# Patient Record
Sex: FEMALE | Race: BLACK OR AFRICAN AMERICAN | NOT HISPANIC OR LATINO | Employment: UNEMPLOYED | URBAN - METROPOLITAN AREA
[De-identification: names, ages, dates, MRNs, and addresses within clinical notes are randomized per-mention and may not be internally consistent; named-entity substitution may affect disease eponyms.]

---

## 2018-01-15 PROBLEM — R10.9 ABDOMINAL PAIN: Status: ACTIVE | Noted: 2018-01-15

## 2018-01-15 PROBLEM — K59.00 CONSTIPATION: Status: ACTIVE | Noted: 2018-01-15

## 2018-01-15 PROBLEM — K60.2 RECTAL FISSURE: Status: ACTIVE | Noted: 2018-01-15

## 2021-01-18 DIAGNOSIS — U07.1 COVID-19 VIRUS DETECTED: ICD-10-CM

## 2021-01-21 ENCOUNTER — NURSE TRIAGE (OUTPATIENT)
Dept: ADMINISTRATIVE | Facility: CLINIC | Age: 46
End: 2021-01-21

## 2021-05-06 ENCOUNTER — PATIENT MESSAGE (OUTPATIENT)
Dept: RESEARCH | Facility: HOSPITAL | Age: 46
End: 2021-05-06

## 2021-08-14 ENCOUNTER — OFFICE VISIT (OUTPATIENT)
Dept: URGENT CARE | Facility: CLINIC | Age: 46
End: 2021-08-14
Payer: MEDICAID

## 2021-08-14 VITALS
WEIGHT: 176 LBS | BODY MASS INDEX: 27.62 KG/M2 | HEART RATE: 60 BPM | OXYGEN SATURATION: 98 % | SYSTOLIC BLOOD PRESSURE: 133 MMHG | TEMPERATURE: 97 F | DIASTOLIC BLOOD PRESSURE: 88 MMHG | HEIGHT: 67 IN | RESPIRATION RATE: 16 BRPM

## 2021-08-14 DIAGNOSIS — R09.81 SINUS CONGESTION: Primary | ICD-10-CM

## 2021-08-14 DIAGNOSIS — J32.9 SINUSITIS, UNSPECIFIED CHRONICITY, UNSPECIFIED LOCATION: ICD-10-CM

## 2021-08-14 LAB
CTP QC/QA: YES
SARS-COV-2 RDRP RESP QL NAA+PROBE: NEGATIVE

## 2021-08-14 PROCEDURE — 99214 OFFICE O/P EST MOD 30 MIN: CPT | Mod: S$GLB,CS,, | Performed by: NURSE PRACTITIONER

## 2021-08-14 PROCEDURE — U0002: ICD-10-PCS | Mod: QW,S$GLB,, | Performed by: NURSE PRACTITIONER

## 2021-08-14 PROCEDURE — 99214 PR OFFICE/OUTPT VISIT, EST, LEVL IV, 30-39 MIN: ICD-10-PCS | Mod: S$GLB,CS,, | Performed by: NURSE PRACTITIONER

## 2021-08-14 PROCEDURE — U0002 COVID-19 LAB TEST NON-CDC: HCPCS | Mod: QW,S$GLB,, | Performed by: NURSE PRACTITIONER

## 2021-08-14 RX ORDER — ROSUVASTATIN CALCIUM 40 MG/1
TABLET, COATED ORAL
COMMUNITY
Start: 2021-05-23

## 2021-08-14 RX ORDER — DOCUSATE SODIUM 100 MG/1
CAPSULE, LIQUID FILLED ORAL
COMMUNITY

## 2021-08-14 RX ORDER — TOPIRAMATE 50 MG/1
TABLET, FILM COATED ORAL
COMMUNITY
Start: 2019-05-23

## 2021-08-14 RX ORDER — FLUOCINONIDE TOPICAL SOLUTION USP, 0.05% 0.5 MG/ML
SOLUTION TOPICAL
COMMUNITY

## 2021-08-14 RX ORDER — OXYBUTYNIN CHLORIDE 15 MG/1
TABLET, EXTENDED RELEASE ORAL
COMMUNITY
End: 2022-08-04

## 2021-08-14 RX ORDER — FLUTICASONE PROPIONATE 50 MCG
1 SPRAY, SUSPENSION (ML) NASAL DAILY
Qty: 1 ML | Refills: 0 | Status: SHIPPED | OUTPATIENT
Start: 2021-08-14 | End: 2022-08-14

## 2021-08-14 RX ORDER — FLUTICASONE PROPIONATE 50 MCG
1 SPRAY, SUSPENSION (ML) NASAL
COMMUNITY
Start: 2020-12-28 | End: 2021-08-14 | Stop reason: SDUPTHER

## 2021-08-14 RX ORDER — DULOXETIN HYDROCHLORIDE 60 MG/1
60 CAPSULE, DELAYED RELEASE ORAL DAILY
COMMUNITY
Start: 2021-02-21 | End: 2023-06-23

## 2021-08-14 RX ORDER — EZETIMIBE 10 MG/1
10 TABLET ORAL DAILY
COMMUNITY
Start: 2021-05-24 | End: 2022-08-04 | Stop reason: SDUPTHER

## 2021-08-14 RX ORDER — FLUCONAZOLE 150 MG/1
TABLET ORAL
COMMUNITY

## 2021-08-14 RX ORDER — DICYCLOMINE HYDROCHLORIDE 10 MG/1
CAPSULE ORAL
COMMUNITY
End: 2022-08-04 | Stop reason: SDUPTHER

## 2021-08-14 RX ORDER — BROMPHENIRAMINE MALEATE, PSEUDOEPHEDRINE HYDROCHLORIDE, AND DEXTROMETHORPHAN HYDROBROMIDE 2; 30; 10 MG/5ML; MG/5ML; MG/5ML
SYRUP ORAL
COMMUNITY
Start: 2021-05-17

## 2021-08-14 RX ORDER — EZETIMIBE 10 MG/1
TABLET ORAL
COMMUNITY

## 2022-09-20 ENCOUNTER — HOSPITAL ENCOUNTER (EMERGENCY)
Facility: HOSPITAL | Age: 47
Discharge: HOME | End: 2022-09-20
Attending: STUDENT IN AN ORGANIZED HEALTH CARE EDUCATION/TRAINING PROGRAM
Payer: MEDICAID

## 2022-09-20 VITALS
TEMPERATURE: 98 F | BODY MASS INDEX: 28.09 KG/M2 | WEIGHT: 179 LBS | SYSTOLIC BLOOD PRESSURE: 127 MMHG | HEART RATE: 60 BPM | HEIGHT: 67 IN | DIASTOLIC BLOOD PRESSURE: 78 MMHG | OXYGEN SATURATION: 99 % | RESPIRATION RATE: 16 BRPM

## 2022-09-20 DIAGNOSIS — R51.9 NONINTRACTABLE HEADACHE, UNSPECIFIED CHRONICITY PATTERN, UNSPECIFIED HEADACHE TYPE: ICD-10-CM

## 2022-09-20 DIAGNOSIS — R20.2 PARESTHESIA: Primary | ICD-10-CM

## 2022-09-20 PROCEDURE — 25000003 PHARM REV CODE 250: Performed by: NURSE PRACTITIONER

## 2022-09-20 PROCEDURE — 99283 EMERGENCY DEPT VISIT LOW MDM: CPT

## 2022-09-20 RX ORDER — DICLOFENAC SODIUM 75 MG/1
75 TABLET, DELAYED RELEASE ORAL 2 TIMES DAILY
Qty: 10 TABLET | Refills: 0 | Status: SHIPPED | OUTPATIENT
Start: 2022-09-20 | End: 2022-09-25

## 2022-09-20 RX ORDER — IBUPROFEN 400 MG/1
400 TABLET ORAL
Status: COMPLETED | OUTPATIENT
Start: 2022-09-20 | End: 2022-09-20

## 2022-09-20 RX ADMIN — IBUPROFEN 400 MG: 400 TABLET, FILM COATED ORAL at 08:09

## 2022-09-20 NOTE — Clinical Note
"Noah Ascencio" Lantigua was seen and treated in our emergency department on 9/20/2022.  She may return to work on 09/22/2022.       If you have any questions or concerns, please don't hesitate to call.      Meagan Mendoza NP"

## 2022-09-21 NOTE — ED PROVIDER NOTES
Encounter Date: 9/20/2022       History     Chief Complaint   Patient presents with    Numbness     Pt stated that she began experiencing numbess to left hand/foot earlier today along with headache. Denied recent illness/      This is a 47-year-old black female with a history of migraine headaches who presents to the emergency department with multiple complaints.  Patient states that today she began with gradual onset frontal headache that began approximately 2 hours ago.  She admits to experiencing migraines before in the past, however she has concerns regarding her blood pressure given her family history.  She states headache is similar to headaches experience in the past.  She also reports experiencing intermittent numbness and tingling to the left hand and left foot.  She reports symptoms wax and wane and also reports experiencing intermittent neck and shoulder pain and left lower back pain.  Review of EMR reveals evaluation for similar symptoms in the past.  She denies vision changes, confusion, difficulty with speech, muscle weakness, nausea/vomiting, chest pain, shortness of breath, or vomiting.    Review of patient's allergies indicates:  No Known Allergies  Past Medical History:   Diagnosis Date    Allergic rhinitis     Anxiety     Chronic constipation     sees Dr. Martinez, been scoped x1    Depression     GERD (gastroesophageal reflux disease)     History of colonoscopy     Migraine headache      Past Surgical History:   Procedure Laterality Date    COLONOSCOPY      HYSTERECTOMY      UPPER GASTROINTESTINAL ENDOSCOPY       Family History   Problem Relation Age of Onset    No Known Problems Mother     Diabetes Father     Cancer Father      Social History     Tobacco Use    Smoking status: Never    Smokeless tobacco: Never   Substance Use Topics    Alcohol use: Yes     Comment: Social    Drug use: No     Review of Systems   Constitutional: Negative.    HENT: Negative.     Respiratory: Negative.      Cardiovascular: Negative.    Gastrointestinal:  Negative for abdominal pain, nausea and vomiting.   Musculoskeletal:  Positive for back pain and neck pain.   Neurological:  Positive for numbness and headaches.     Physical Exam     Initial Vitals [09/20/22 1948]   BP Pulse Resp Temp SpO2   127/78 60 16 98.3 °F (36.8 °C) 99 %      MAP       --         Physical Exam    Nursing note and vitals reviewed.  Constitutional: She appears well-developed and well-nourished. She is active. No distress.   HENT:   Head: Normocephalic and atraumatic.   Mouth/Throat: Oropharynx is clear and moist. No oropharyngeal exudate.   Eyes: EOM are normal. Pupils are equal, round, and reactive to light.   Neck: Neck supple.   Normal range of motion.  Cardiovascular:  Normal rate, regular rhythm and normal heart sounds.           Pulmonary/Chest: Breath sounds normal. No respiratory distress.   Musculoskeletal:         General: Normal range of motion.      Cervical back: Normal range of motion and neck supple.      Comments: Hands and feet appear normal upon inspection, no rash or discoloration noted.  Sensation intact, range of motion normal with 5/5 muscle strength.  Neurovascularly intact     Neurological: She is alert and oriented to person, place, and time. She has normal strength. No cranial nerve deficit. GCS score is 15. GCS eye subscore is 4. GCS verbal subscore is 5. GCS motor subscore is 6.   Cranial nerves 2-12 intact, patient able to perform rapid alternating movement tests appropriately.   Skin: Skin is warm and dry. Capillary refill takes less than 2 seconds.   Psychiatric: She has a normal mood and affect. Her behavior is normal. Thought content normal.       ED Course   Procedures  Labs Reviewed - No data to display       Imaging Results    None          Medications   ibuprofen tablet 400 mg (has no administration in time range)                              Clinical Impression:   Final diagnoses:  [R20.2] Paresthesia  (Primary)  [R51.9] Nonintractable headache, unspecified chronicity pattern, unspecified headache type      ED Disposition Condition    Discharge Stable          ED Prescriptions       Medication Sig Dispense Start Date End Date Auth. Provider    diclofenac (VOLTAREN) 75 MG EC tablet Take 1 tablet (75 mg total) by mouth 2 (two) times daily. for 5 days 10 tablet 9/20/2022 9/25/2022 Meagan Mendoza NP          Follow-up Information       Follow up With Specialties Details Why Contact Info    PCP Follow UP  Schedule an appointment as soon as possible for a visit in 2 days for follow-up, for re-evaluation of today's complaint              Meagan Mendoza NP  09/20/22 2020

## 2022-09-21 NOTE — DISCHARGE INSTRUCTIONS
Take medication as directed.  It is important that you follow-up with your primary doctor in 1-2 days for further evaluation.  Return to the emergency room for worsening condition.

## 2022-11-14 ENCOUNTER — HOSPITAL ENCOUNTER (EMERGENCY)
Facility: HOSPITAL | Age: 47
Discharge: HOME | End: 2022-11-14
Attending: EMERGENCY MEDICINE
Payer: MEDICAID

## 2022-11-14 VITALS
HEIGHT: 67 IN | HEART RATE: 70 BPM | OXYGEN SATURATION: 99 % | BODY MASS INDEX: 28.41 KG/M2 | TEMPERATURE: 99 F | WEIGHT: 181 LBS | SYSTOLIC BLOOD PRESSURE: 123 MMHG | RESPIRATION RATE: 16 BRPM | DIASTOLIC BLOOD PRESSURE: 69 MMHG

## 2022-11-14 DIAGNOSIS — M79.604 RIGHT LEG PAIN: Primary | ICD-10-CM

## 2022-11-14 LAB — D DIMER PPP IA.FEU-MCNC: 0.36 MG/L FEU

## 2022-11-14 PROCEDURE — 99284 EMERGENCY DEPT VISIT MOD MDM: CPT

## 2022-11-14 PROCEDURE — 63600175 PHARM REV CODE 636 W HCPCS: Performed by: EMERGENCY MEDICINE

## 2022-11-14 PROCEDURE — 36415 COLL VENOUS BLD VENIPUNCTURE: CPT | Performed by: EMERGENCY MEDICINE

## 2022-11-14 PROCEDURE — 85379 FIBRIN DEGRADATION QUANT: CPT | Performed by: EMERGENCY MEDICINE

## 2022-11-14 PROCEDURE — 96372 THER/PROPH/DIAG INJ SC/IM: CPT | Performed by: EMERGENCY MEDICINE

## 2022-11-14 RX ORDER — CYCLOBENZAPRINE HCL 10 MG
10 TABLET ORAL 3 TIMES DAILY PRN
Qty: 15 TABLET | Refills: 0 | Status: SHIPPED | OUTPATIENT
Start: 2022-11-14 | End: 2022-11-19

## 2022-11-14 RX ORDER — DICLOFENAC SODIUM 75 MG/1
75 TABLET, DELAYED RELEASE ORAL 2 TIMES DAILY PRN
Qty: 10 TABLET | Refills: 0 | Status: SHIPPED | OUTPATIENT
Start: 2022-11-14

## 2022-11-14 RX ORDER — KETOROLAC TROMETHAMINE 30 MG/ML
30 INJECTION, SOLUTION INTRAMUSCULAR; INTRAVENOUS
Status: COMPLETED | OUTPATIENT
Start: 2022-11-14 | End: 2022-11-14

## 2022-11-14 RX ADMIN — KETOROLAC TROMETHAMINE 30 MG: 30 INJECTION, SOLUTION INTRAMUSCULAR at 07:11

## 2022-11-15 NOTE — ED PROVIDER NOTES
"Encounter Date: 11/14/2022       History     Chief Complaint   Patient presents with    Leg Pain     Pt stated that she began experiencing pain to right thigh and right calf earlier in the evening. Stated that she has a "vein surgery" about a month ago.      48 yo  female with history of PAD here via POV with complaint of right leg pain x 1 day. No trauma. Gradual onset. No numbness or tingling. No swelling. No prior similar. No history of DVT.     Review of patient's allergies indicates:  No Known Allergies  Past Medical History:   Diagnosis Date    Allergic rhinitis     Anxiety     Chronic constipation     sees Dr. Martinez, been scoped x1    Depression     GERD (gastroesophageal reflux disease)     History of colonoscopy     Migraine headache      Past Surgical History:   Procedure Laterality Date    COLONOSCOPY      HYSTERECTOMY      UPPER GASTROINTESTINAL ENDOSCOPY       Family History   Problem Relation Age of Onset    No Known Problems Mother     Diabetes Father     Cancer Father      Social History     Tobacco Use    Smoking status: Never    Smokeless tobacco: Never   Substance Use Topics    Alcohol use: Yes     Comment: Social    Drug use: No     Review of Systems   Constitutional: Negative.    Respiratory: Negative.     Cardiovascular: Negative.    Gastrointestinal: Negative.    All other systems reviewed and are negative.    Physical Exam     Initial Vitals [11/14/22 1909]   BP Pulse Resp Temp SpO2   123/69 70 16 98.6 °F (37 °C) 99 %      MAP       --         Physical Exam    Nursing note and vitals reviewed.  Constitutional: She appears well-developed and well-nourished. She is not diaphoretic. No distress.   HENT:   Head: Normocephalic and atraumatic.   Eyes: EOM are normal. Pupils are equal, round, and reactive to light.   Neck: Neck supple.   Normal range of motion.  Cardiovascular:  Normal rate, regular rhythm and intact distal pulses.     Exam reveals no gallop.       Pulmonary/Chest: Breath sounds " normal. No respiratory distress. She has no wheezes. She has no rales.   Abdominal: Abdomen is soft. Bowel sounds are normal. She exhibits no distension. There is no abdominal tenderness. There is no rebound.   Musculoskeletal:         General: No tenderness or edema. Normal range of motion.      Cervical back: Normal range of motion and neck supple.     Neurological: She is alert. She has normal strength and normal reflexes.   Skin: Skin is warm and dry. Capillary refill takes less than 2 seconds.       ED Course   Procedures  Labs Reviewed   D DIMER, QUANTITATIVE          Imaging Results    None          Medications   ketorolac injection 30 mg (30 mg Intramuscular Given 11/14/22 1945)     Medical Decision Making:   Clinical Tests:   Lab Tests: Ordered and Reviewed                        Clinical Impression:   Final diagnoses:  [M79.604] Right leg pain (Primary)        ED Disposition Condition    Discharge Stable          ED Prescriptions       Medication Sig Dispense Start Date End Date Auth. Provider    diclofenac (VOLTAREN) 75 MG EC tablet Take 1 tablet (75 mg total) by mouth 2 (two) times daily as needed. 10 tablet 11/14/2022 -- Ambrosio Murphy MD    cyclobenzaprine (FLEXERIL) 10 MG tablet Take 1 tablet (10 mg total) by mouth 3 (three) times daily as needed for Muscle spasms. 15 tablet 11/14/2022 11/19/2022 Ambrosio Murphy MD          Follow-up Information    None          Ambrosio Murphy MD  11/14/22 2024

## 2022-12-15 ENCOUNTER — HOSPITAL ENCOUNTER (OUTPATIENT)
Dept: RADIOLOGY | Facility: HOSPITAL | Age: 47
Discharge: HOME | End: 2022-12-15
Payer: MEDICAID

## 2022-12-15 DIAGNOSIS — M79.604 RIGHT LEG PAIN: ICD-10-CM

## 2022-12-15 DIAGNOSIS — M79.604 RIGHT LEG PAIN: Primary | ICD-10-CM

## 2022-12-15 PROCEDURE — 93971 EXTREMITY STUDY: CPT | Mod: TC,RT

## 2022-12-21 DIAGNOSIS — Z12.31 ENCOUNTER FOR SCREENING MAMMOGRAM FOR MALIGNANT NEOPLASM OF BREAST: Primary | ICD-10-CM

## 2022-12-23 ENCOUNTER — HOSPITAL ENCOUNTER (OUTPATIENT)
Dept: RADIOLOGY | Facility: HOSPITAL | Age: 47
Discharge: HOME | End: 2022-12-23
Attending: OBSTETRICS & GYNECOLOGY
Payer: MEDICAID

## 2022-12-23 VITALS — WEIGHT: 181 LBS | HEIGHT: 67 IN | BODY MASS INDEX: 28.41 KG/M2

## 2022-12-23 DIAGNOSIS — Z12.31 ENCOUNTER FOR SCREENING MAMMOGRAM FOR MALIGNANT NEOPLASM OF BREAST: ICD-10-CM

## 2022-12-23 PROCEDURE — 77063 BREAST TOMOSYNTHESIS BI: CPT | Mod: TC

## 2022-12-23 PROCEDURE — 77067 SCR MAMMO BI INCL CAD: CPT | Mod: TC

## 2023-01-04 ENCOUNTER — TELEPHONE (OUTPATIENT)
Dept: ORTHOPEDICS | Facility: CLINIC | Age: 48
End: 2023-01-04
Payer: MEDICAID

## 2023-01-05 ENCOUNTER — HOSPITAL ENCOUNTER (OUTPATIENT)
Dept: RADIOLOGY | Facility: HOSPITAL | Age: 48
Discharge: HOME | End: 2023-01-05
Attending: NURSE PRACTITIONER
Payer: MEDICAID

## 2023-01-05 ENCOUNTER — OFFICE VISIT (OUTPATIENT)
Dept: ORTHOPEDICS | Facility: CLINIC | Age: 48
End: 2023-01-05
Payer: MEDICAID

## 2023-01-05 VITALS
WEIGHT: 181 LBS | SYSTOLIC BLOOD PRESSURE: 110 MMHG | OXYGEN SATURATION: 97 % | HEIGHT: 67 IN | DIASTOLIC BLOOD PRESSURE: 74 MMHG | HEART RATE: 66 BPM | BODY MASS INDEX: 28.41 KG/M2

## 2023-01-05 DIAGNOSIS — M25.561 ACUTE PAIN OF RIGHT KNEE: ICD-10-CM

## 2023-01-05 DIAGNOSIS — M79.661 PAIN IN BOTH LOWER LEGS: ICD-10-CM

## 2023-01-05 DIAGNOSIS — M25.561 ACUTE PAIN OF RIGHT KNEE: Primary | ICD-10-CM

## 2023-01-05 DIAGNOSIS — M54.10 RADICULAR SYNDROME OF LOWER LIMBS: ICD-10-CM

## 2023-01-05 DIAGNOSIS — M79.662 PAIN IN BOTH LOWER LEGS: ICD-10-CM

## 2023-01-05 PROCEDURE — 99203 OFFICE O/P NEW LOW 30 MIN: CPT | Mod: S$PBB,,, | Performed by: NURSE PRACTITIONER

## 2023-01-05 PROCEDURE — 1160F PR REVIEW ALL MEDS BY PRESCRIBER/CLIN PHARMACIST DOCUMENTED: ICD-10-PCS | Mod: CPTII,,, | Performed by: NURSE PRACTITIONER

## 2023-01-05 PROCEDURE — 3078F DIAST BP <80 MM HG: CPT | Mod: CPTII,,, | Performed by: NURSE PRACTITIONER

## 2023-01-05 PROCEDURE — 3008F PR BODY MASS INDEX (BMI) DOCUMENTED: ICD-10-PCS | Mod: CPTII,,, | Performed by: NURSE PRACTITIONER

## 2023-01-05 PROCEDURE — 1160F RVW MEDS BY RX/DR IN RCRD: CPT | Mod: CPTII,,, | Performed by: NURSE PRACTITIONER

## 2023-01-05 PROCEDURE — 99999 PR PBB SHADOW E&M-EST. PATIENT-LVL III: ICD-10-PCS | Mod: PBBFAC,,, | Performed by: NURSE PRACTITIONER

## 2023-01-05 PROCEDURE — 1159F PR MEDICATION LIST DOCUMENTED IN MEDICAL RECORD: ICD-10-PCS | Mod: CPTII,,, | Performed by: NURSE PRACTITIONER

## 2023-01-05 PROCEDURE — 99999 PR PBB SHADOW E&M-EST. PATIENT-LVL III: CPT | Mod: PBBFAC,,, | Performed by: NURSE PRACTITIONER

## 2023-01-05 PROCEDURE — 3074F SYST BP LT 130 MM HG: CPT | Mod: CPTII,,, | Performed by: NURSE PRACTITIONER

## 2023-01-05 PROCEDURE — 99213 OFFICE O/P EST LOW 20 MIN: CPT | Mod: PBBFAC | Performed by: NURSE PRACTITIONER

## 2023-01-05 PROCEDURE — 73562 X-RAY EXAM OF KNEE 3: CPT | Mod: TC,RT

## 2023-01-05 PROCEDURE — 3074F PR MOST RECENT SYSTOLIC BLOOD PRESSURE < 130 MM HG: ICD-10-PCS | Mod: CPTII,,, | Performed by: NURSE PRACTITIONER

## 2023-01-05 PROCEDURE — 1159F MED LIST DOCD IN RCRD: CPT | Mod: CPTII,,, | Performed by: NURSE PRACTITIONER

## 2023-01-05 PROCEDURE — 3078F PR MOST RECENT DIASTOLIC BLOOD PRESSURE < 80 MM HG: ICD-10-PCS | Mod: CPTII,,, | Performed by: NURSE PRACTITIONER

## 2023-01-05 PROCEDURE — 99203 PR OFFICE/OUTPT VISIT, NEW, LEVL III, 30-44 MIN: ICD-10-PCS | Mod: S$PBB,,, | Performed by: NURSE PRACTITIONER

## 2023-01-05 PROCEDURE — 3008F BODY MASS INDEX DOCD: CPT | Mod: CPTII,,, | Performed by: NURSE PRACTITIONER

## 2023-01-05 RX ORDER — HYDROXYZINE HYDROCHLORIDE 25 MG/1
25 TABLET, FILM COATED ORAL 3 TIMES DAILY
COMMUNITY

## 2023-01-05 NOTE — PROGRESS NOTES
Subjective:       Noah Lantigua is a 47 y.o. female who presents for evaluation of right knee and bilateral lower leg pain. She is referred by [facility]. She states onset of several months of discomfort in the right knee, worsen over the past 3 weeks after having a twist type injury to her knee. She states that she is seeing [facility] for PAD in her lower extremities and had recent vein procedures done. She reports increasing pain in her legs, she was told that the pain may be referred from her back. She went to the ER on 11/14/22 due to right leg pain. Ultrasound was done and was negative for DVT. She presents and rates her pain as 5/10 in the right knee area but also has discomfort in the left lower leg as well. She reports mild back stiffness. She denies any groin or anterior thigh discomfort. She reports intermittent radicular pain and tingling/numbness into the lower legs. The pain is described as aching and throbbing. Symptoms are exacerbated by standing, walking and prolonged sitting. She is taking diclofenac with mild relief. She is noted with a mild limp.     The following portions of the patient's history were reviewed and updated as appropriate: allergies, current medications, past family history, past medical history, past social history, past surgical history and problem list.     Review of Systems  Constitutional: negative  Musculoskeletal:positive for back pain and stiff joints  Neurological: negative  Behavioral/Psych: negative      Objective:   Neurologically intact.   Mild antalgic gait.      Lumbar exam:  Normal range of motion.   Muscle tone: negative muscle spasm  Straight leg raise: neg  Neurological: DTRs: +2 L4 B/L and +2S1 B/L, sensation intact lower dermatomes.   Muscle strengths: Decreased quad strength B/L 4/5.      HIP Exam:  AROM/PROM hips normal, no groin pain.   No tenderness trochanters and IT bands.   Neg jar test. Neg fadirs. No pain with log-rolling.   Brisk cap  refill noted.     RT Knee Exam:  No effusion. Normal AROM.   No instability. Neg Mcmurrays. No crepitus.   Mild TTP joint lines.      RADIOGRAPHS: Ordered and reviewed RT knee 3 V  Medial joint space narrowing.    No significant effusion.    No acute fracture.    Assessment:   1. RT knee pain, mild narrowing noted.   2. Pain lower legs bilateral, unspecified.  3. Lumbar radiculopathy suspected.     Plan:      1. Radiographs show some narrowing of the medial knee joint. Exam is benign. Reviewed physician directed exercises for ROM/strengthening. (3x wk x 6 wks), packet given.   2. Referral to [facility] (Dr Rodriguez) for unspecified radiculitis lower extremities. Suspected lumbar radiculopathy.  3. Continue diclofenac as previous. Ice and rest. Limit stairs and squatting.   4. RTC as directed post [facility] visit for follow up, consider MRI L-spine and RT knee if needed.  5. She had no further questions.

## 2023-03-16 ENCOUNTER — HOSPITAL ENCOUNTER (EMERGENCY)
Facility: HOSPITAL | Age: 48
Discharge: HOME | End: 2023-03-16
Attending: EMERGENCY MEDICINE
Payer: MEDICAID

## 2023-03-16 VITALS
WEIGHT: 183 LBS | TEMPERATURE: 98 F | DIASTOLIC BLOOD PRESSURE: 71 MMHG | OXYGEN SATURATION: 99 % | RESPIRATION RATE: 16 BRPM | SYSTOLIC BLOOD PRESSURE: 121 MMHG | HEART RATE: 72 BPM | BODY MASS INDEX: 28.72 KG/M2 | HEIGHT: 67 IN

## 2023-03-16 DIAGNOSIS — M79.605 PAIN OF LEFT LOWER EXTREMITY: Primary | ICD-10-CM

## 2023-03-16 PROCEDURE — 99283 EMERGENCY DEPT VISIT LOW MDM: CPT

## 2023-03-16 RX ORDER — NAPROXEN 500 MG/1
500 TABLET ORAL EVERY 12 HOURS PRN
Qty: 20 TABLET | Refills: 0 | Status: SHIPPED | OUTPATIENT
Start: 2023-03-16

## 2023-03-16 NOTE — ED PROVIDER NOTES
"EMERGENCY DEPARTMENT HISTORY AND PHYSICAL EXAM     This note is dictated on M*[employer] word recognition program.  There are word recognition mistakes and grammatical errors that are occasionally missed on review.     Date: 3/16/2023   Patient Name: Noah Lantigua       History of Presenting Illness      Chief Complaint   Patient presents with    Leg Problem     Pt stated that she noticed a "knot on her left lower leg". Denied any known injury/trauma.         1730   Noah Lantigua is a 47 y.o. female with PMHX of anxiety, depression who presents to the emergency department C/O left leg pain.    Patient reports feeling a knot in her left lower leg.  Noticed today.  Reports it is tender.  No trauma or injury.  She has not tried anything for this problem yet.      PCP: [facility]        No current facility-administered medications for this encounter.     Current Outpatient Medications   Medication Sig Dispense Refill    AMITIZA 24 mcg Cap TAKE 1 CAPSULE(24 MCG) BY MOUTH TWICE DAILY (Patient not taking: Reported on 1/5/2023) 90 capsule 3    aspirin (ECOTRIN) 81 MG EC tablet Take 81 mg by mouth once daily.      atorvastatin (LIPITOR) 20 MG tablet Take 20 mg by mouth once daily.      BRINTELLIX 10 mg Tab Take 1 tablet by mouth once daily.   2    brompheniramine-pseudoeph-DM (BROMFED DM) 2-30-10 mg/5 mL Syrp TAKE 10 ML BY MOUTH EVERY 6 HOURS AS NEEDED FOR COUGH AND CONGESTION      clonazePAM (KLONOPIN) 0.5 MG tablet TK 1 T PO ONCE D PRA      cyproheptadine (PERIACTIN) 4 mg tablet Take 4 mg by mouth 3 (three) times daily as needed.      diclofenac (VOLTAREN) 75 MG EC tablet Take 1 tablet (75 mg total) by mouth 2 (two) times daily as needed. 10 tablet 0    dicyclomine (BENTYL) 10 MG capsule TAKE ONE CAPSULE BY MOUTH FOUR TIMES DAILY BEFORE MEALS AND NIGHTLY 120 capsule 0    docusate sodium (COLACE) 100 MG capsule Doc-Q-Lace 100 mg capsule   TK 1 C PO BID      DULoxetine (CYMBALTA) 60 MG capsule " Take 60 mg by mouth once daily.      estradiol (ESTRACE) 2 MG tablet Take 2 mg by mouth once daily.      ezetimibe (ZETIA) 10 mg tablet ezetimibe 10 mg tablet   TK 1 T PO QD      fluconazole (DIFLUCAN) 150 MG Tab fluconazole 150 mg tablet      fluocinonide (LIDEX) 0.05 % external solution fluocinonide 0.05 % topical solution      fluoxetine (PROZAC) 10 MG capsule Take 10 mg by mouth once daily.      hydrOXYzine HCL (ATARAX) 25 MG tablet Take 25 mg by mouth 3 (three) times daily.      lorazepam (ATIVAN) 0.5 MG tablet Take 0.5 mg by mouth every 6 (six) hours as needed for Anxiety.      montelukast (SINGULAIR) 10 mg tablet Take 10 mg by mouth every evening.      naproxen (NAPROSYN) 500 MG tablet Take 1 tablet (500 mg total) by mouth every 12 (twelve) hours as needed (Pain). 20 tablet 0    pantoprazole (PROTONIX) 40 MG tablet Take 1 tablet (40 mg total) by mouth once daily. 30 tablet 1    plecanatide (TRULANCE) 3 mg Tab Take 3 mg by mouth once daily. 90 tablet 3    polyethylene glycol (GLYCOLAX) 17 gram/dose powder Take 17 g by mouth once daily. 507 g 0    predniSONE (DELTASONE) 10 MG tablet Take 10 mg by mouth once daily.      QUEtiapine (SEROQUEL) 50 MG tablet Take 50 mg by mouth every evening.      rosuvastatin (CRESTOR) 40 MG Tab TAKE 1 TABLET BY MOUTH 1 TIME IN THE EVENING      sorbitoL 70 % solution Take 15 mLs by mouth every 72 hours as needed (Breakthrough constipation not responsive to other medications). 473 mL 0    topiramate (TOPAMAX) 50 MG tablet topiramate 50 mg tablet   Take 1 tablet twice a day by oral route as directed for 30 days.             Past History     Past Medical History:   Past Medical History:   Diagnosis Date    Allergic rhinitis     Anxiety     Chronic constipation     sees Dr. Martinez, been scoped x1    Depression     GERD (gastroesophageal reflux disease)     History of colonoscopy     Migraine headache         Past Surgical History:   Past Surgical History:   Procedure Laterality Date     "COLONOSCOPY      HYSTERECTOMY      OOPHORECTOMY      UPPER GASTROINTESTINAL ENDOSCOPY          Family History:   Family History   Problem Relation Age of Onset    No Known Problems Mother     Diabetes Father     Cancer Father     No Known Problems Sister     No Known Problems Daughter     No Known Problems Maternal Aunt     No Known Problems Maternal Uncle     No Known Problems Paternal Aunt     No Known Problems Paternal Uncle     No Known Problems Maternal Grandmother     No Known Problems Maternal Grandfather     No Known Problems Paternal Grandmother     No Known Problems Paternal Grandfather     No Known Problems Other     Breast cancer Neg Hx     Ovarian cancer Neg Hx     BRCA 1/2 Neg Hx         Social History:   Social History     Tobacco Use    Smoking status: Never    Smokeless tobacco: Never   Substance Use Topics    Alcohol use: Yes     Comment: Social    Drug use: No        Allergies:   Review of patient's allergies indicates:  No Known Allergies       Review of Systems   Review of Systems   See HPI for pertinent positives and negatives       Physical Exam     Vitals:    03/16/23 1636   BP: 121/71   Pulse: 72   Resp: 16   Temp: 98.4 °F (36.9 °C)   SpO2: 99%   Weight: 83 kg (183 lb)   Height: 5' 7" (1.702 m)      Physical Exam  Vitals and nursing note reviewed.   Constitutional:       General: She is not in acute distress.     Appearance: Normal appearance. She is not ill-appearing.   HENT:      Head: Normocephalic and atraumatic.      Right Ear: External ear normal.      Left Ear: External ear normal.      Nose: Nose normal. No congestion or rhinorrhea.      Mouth/Throat:      Mouth: Mucous membranes are moist.   Eyes:      Conjunctiva/sclera: Conjunctivae normal.      Pupils: Pupils are equal, round, and reactive to light.   Pulmonary:      Effort: Pulmonary effort is normal. No respiratory distress.   Musculoskeletal:         General: No deformity. Normal range of motion.      Cervical back: Normal " range of motion. No rigidity.      Right lower leg: Normal. No swelling. No edema.      Left lower leg: Tenderness present. No swelling or bony tenderness. No edema.        Legs:       Comments: Minor fullness and tenderness to the medial aspect just adjacent to mid tibia, skin is normal   Skin:     General: Skin is dry.   Neurological:      General: No focal deficit present.      Mental Status: She is alert and oriented to person, place, and time. Mental status is at baseline.   Psychiatric:         Mood and Affect: Mood normal.         Behavior: Behavior normal.            Diagnostic Study Results      Labs -   No results found for this or any previous visit (from the past 12 hour(s)).     Radiologic Studies -    No orders to display        Medications given in the ED-   Medications - No data to display        Medical Decision Making    I am the first provider for this patient.     I reviewed the vital signs, available nursing notes, past medical history, past surgical history, family history and social history.     Vital Signs:  Reviewed the patient's vital signs.     Pulse Oximetry Analysis and Interpretation:    99% on Room Air, normal      External Test Results (Pertinent to encounter):    Records Reviewed: Nursing Notes and Current Prescription Medications    History Obtained By: Patient    Provider Notes: Noah Lantigua is a 47 y.o. female with left leg pain    Co-morbidities Considered:  No significant comorbidities such as history of DVT    Differential Diagnosis:  Myalgias, muscle spasm, bony lesion, superficial thrombophlebitis      ED Course:    Patient here with atraumatic left lower extremity pain.  Benign exam.  No tenderness or fullness along deep venous system.  No bony tenderness.  Doubt malignancy or DVT.  Advised symptomatic management including NSAIDs, warm compresses.  Advised patient return to ED if she develops leg swelling or tenderness along the posterior aspect of her lower leg or  knee         Problems Addressed:  Leg pain    Procedures:   Procedures       Diagnosis and Disposition     Critical Care:      DISCHARGE NOTE:       Noah Lantigua's  results have been reviewed with her.  She has been counseled regarding her diagnosis, treatment, and plan.  She verbally conveys understanding and agreement of the signs, symptoms, diagnosis, treatment and prognosis and additionally agrees to follow up as discussed.  She also agrees with the care-plan and conveys that all of her questions have been answered.  I have also provided discharge instructions for her that include: educational information regarding their diagnosis and treatment, and list of reasons why they would want to return to the ED prior to their follow-up appointment, should her condition change. She has been provided with education for proper emergency department utilization.         CLINICAL IMPRESSION:         1. Pain of left lower extremity              PLAN:   1. Discharge Home  2.      Medication List        START taking these medications      naproxen 500 MG tablet  Commonly known as: NAPROSYN  Take 1 tablet (500 mg total) by mouth every 12 (twelve) hours as needed (Pain).            ASK your doctor about these medications      AMITIZA 24 MCG Cap  Generic drug: lubiprostone  TAKE 1 CAPSULE(24 MCG) BY MOUTH TWICE DAILY     aspirin 81 MG EC tablet  Commonly known as: ECOTRIN     atorvastatin 20 MG tablet  Commonly known as: LIPITOR     BRINTELLIX 10 mg Tab  Generic drug: vortioxetine     brompheniramine-pseudoeph-DM 2-30-10 mg/5 mL Syrp  Commonly known as: BROMFED DM     clonazePAM 0.5 MG tablet  Commonly known as: KlonoPIN     cyproheptadine 4 mg tablet  Commonly known as: PERIACTIN     diclofenac 75 MG EC tablet  Commonly known as: VOLTAREN  Take 1 tablet (75 mg total) by mouth 2 (two) times daily as needed.     dicyclomine 10 MG capsule  Commonly known as: BENTYL  TAKE ONE CAPSULE BY MOUTH FOUR TIMES DAILY BEFORE MEALS AND  NIGHTLY     docusate sodium 100 MG capsule  Commonly known as: COLACE     DULoxetine 60 MG capsule  Commonly known as: CYMBALTA     estradioL 2 MG tablet  Commonly known as: ESTRACE     ezetimibe 10 mg tablet  Commonly known as: ZETIA     fluconazole 150 MG Tab  Commonly known as: DIFLUCAN     fluocinonide 0.05 % external solution  Commonly known as: LIDEX     FLUoxetine 10 MG capsule     hydrOXYzine HCL 25 MG tablet  Commonly known as: ATARAX     LORazepam 0.5 MG tablet  Commonly known as: ATIVAN     montelukast 10 mg tablet  Commonly known as: SINGULAIR     pantoprazole 40 MG tablet  Commonly known as: PROTONIX  Take 1 tablet (40 mg total) by mouth once daily.     polyethylene glycol 17 gram/dose powder  Commonly known as: GLYCOLAX  Take 17 g by mouth once daily.     predniSONE 10 MG tablet  Commonly known as: DELTASONE     QUEtiapine 50 MG tablet  Commonly known as: SEROQUEL     rosuvastatin 40 MG Tab  Commonly known as: CRESTOR     sorbitoL 70 % solution  Take 15 mLs by mouth every 72 hours as needed (Breakthrough constipation not responsive to other medications).     topiramate 50 MG tablet  Commonly known as: TOPAMAX     TRULANCE 3 mg Tab  Generic drug: plecanatide  Take 3 mg by mouth once daily.               Where to Get Your Medications        These medications were sent to [facility][id number] - [address] AT [facility] OF [facility] & [place] [address] [facility] [address]      Phone: [phone]   naproxen 500 MG tablet        [address]. [facility]  [address] [facility] [address] [phone]    Schedule an appointment as soon as possible for a visit   Primary care follow up       _______________________________     Please note that this dictation was completed with [employer], the computer voice recognition software.  Quite often unanticipated grammatical, syntax, homophones, and other interpretive errors are inadvertently transcribed by the computer  software.  Please disregard these errors.  Please excuse any errors that have escaped final proofreading.             Quinn Calles MD  03/16/23 [id number]

## 2023-06-12 ENCOUNTER — HOSPITAL ENCOUNTER (EMERGENCY)
Facility: HOSPITAL | Age: 48
Discharge: HOME | End: 2023-06-12
Attending: EMERGENCY MEDICINE
Payer: MEDICAID

## 2023-06-12 VITALS
SYSTOLIC BLOOD PRESSURE: 121 MMHG | HEIGHT: 67 IN | OXYGEN SATURATION: 98 % | DIASTOLIC BLOOD PRESSURE: 78 MMHG | HEART RATE: 79 BPM | BODY MASS INDEX: 28.88 KG/M2 | RESPIRATION RATE: 16 BRPM | TEMPERATURE: 98 F | WEIGHT: 184 LBS

## 2023-06-12 DIAGNOSIS — M25.512 ACUTE PAIN OF LEFT SHOULDER: Primary | ICD-10-CM
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
## 2023-06-12 PROCEDURE — 99284 EMERGENCY DEPT VISIT MOD MDM: CPT

## 2023-06-12 PROCEDURE — 96372 THER/PROPH/DIAG INJ SC/IM: CPT | Performed by: EMERGENCY MEDICINE

## 2023-06-12 PROCEDURE — 63600175 PHARM REV CODE 636 W HCPCS: Performed by: EMERGENCY MEDICINE

## 2023-06-12 RX ORDER — KETOROLAC TROMETHAMINE 30 MG/ML
30 INJECTION, SOLUTION INTRAMUSCULAR; INTRAVENOUS
Status: COMPLETED | OUTPATIENT
Start: 2023-06-12 | End: 2023-06-12

## 2023-06-12 RX ORDER — CYCLOBENZAPRINE HCL 10 MG
10 TABLET ORAL 3 TIMES DAILY PRN
Qty: 15 TABLET | Refills: 0 | Status: SHIPPED | OUTPATIENT
Start: 2023-06-12 | End: 2023-06-17

## 2023-06-12 RX ADMIN — KETOROLAC TROMETHAMINE 30 MG: 30 INJECTION, SOLUTION INTRAMUSCULAR; INTRAVENOUS at 08:06
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
## 2023-12-19 DIAGNOSIS — Z12.31 ENCOUNTER FOR SCREENING MAMMOGRAM FOR BREAST CANCER: Primary | ICD-10-CM

## 2024-01-17 ENCOUNTER — HOSPITAL ENCOUNTER (EMERGENCY)
Facility: HOSPITAL | Age: 49
Discharge: HOME | End: 2024-01-17
Attending: EMERGENCY MEDICINE
Payer: MEDICAID

## 2024-01-17 VITALS
SYSTOLIC BLOOD PRESSURE: 149 MMHG | RESPIRATION RATE: 18 BRPM | WEIGHT: 176.81 LBS | DIASTOLIC BLOOD PRESSURE: 85 MMHG | OXYGEN SATURATION: 99 % | HEART RATE: 76 BPM | BODY MASS INDEX: 27.75 KG/M2 | HEIGHT: 67 IN | TEMPERATURE: 99 F

## 2024-01-17 DIAGNOSIS — U07.1 COVID: Primary | ICD-10-CM

## 2024-01-17 LAB
CTP QC/QA: YES
CTP QC/QA: YES
POC MOLECULAR INFLUENZA A AGN: NEGATIVE
POC MOLECULAR INFLUENZA B AGN: NEGATIVE
SARS-COV-2 RDRP RESP QL NAA+PROBE: POSITIVE

## 2024-01-17 PROCEDURE — 87502 INFLUENZA DNA AMP PROBE: CPT

## 2024-01-17 PROCEDURE — 99282 EMERGENCY DEPT VISIT SF MDM: CPT

## 2024-01-17 PROCEDURE — 87635 SARS-COV-2 COVID-19 AMP PRB: CPT | Performed by: CLINICAL NURSE SPECIALIST

## 2024-01-17 NOTE — Clinical Note
"Noah Ascencio" Lantigua was seen and treated in our emergency department on 1/17/2024.  She may return to work on 01/22/2024.       If you have any questions or concerns, please don't hesitate to call.      Ambrosio Murphy MD"

## 2024-01-18 NOTE — DISCHARGE INSTRUCTIONS
Flu negative.  COVID COVID positive.  Take over-the-counter medications as needed for symptoms.  Can alternate Tylenol ibuprofen as needed for headache, body aches, fever.  Quarantine for 5 days

## 2024-01-18 NOTE — ED PROVIDER NOTES
"Encounter Date: 1/17/2024       History     Chief Complaint   Patient presents with    Otalgia     Pt to the ER w/ complaints of L sided ear ache "for a while" and sore throat x 1 day.      48-year-old female presents emergency room for left earache, sore throat for the last few days.  Patient requests flu swab due to having a positive exposure        Review of patient's allergies indicates:  No Known Allergies  Past Medical History:   Diagnosis Date    Allergic rhinitis     Anxiety     Chronic constipation     sees Dr. Martinez, been scoped x1    Colitis     Depression     GERD (gastroesophageal reflux disease)     History of colonoscopy     Migraine headache      Past Surgical History:   Procedure Laterality Date    COLONOSCOPY      HYSTERECTOMY      OOPHORECTOMY      UPPER GASTROINTESTINAL ENDOSCOPY       Family History   Problem Relation Age of Onset    No Known Problems Mother     Diabetes Father     Cancer Father     No Known Problems Sister     No Known Problems Daughter     No Known Problems Maternal Aunt     No Known Problems Maternal Uncle     No Known Problems Paternal Aunt     No Known Problems Paternal Uncle     No Known Problems Maternal Grandmother     No Known Problems Maternal Grandfather     No Known Problems Paternal Grandmother     No Known Problems Paternal Grandfather     No Known Problems Other     Breast cancer Neg Hx     Ovarian cancer Neg Hx     BRCA 1/2 Neg Hx      Social History     Tobacco Use    Smoking status: Never    Smokeless tobacco: Never   Substance Use Topics    Alcohol use: Yes     Comment: Social    Drug use: No     Review of Systems   Constitutional:  Negative for fever.   HENT:  Positive for ear pain and sore throat.    Respiratory:  Negative for shortness of breath.    Cardiovascular:  Negative for chest pain.   Gastrointestinal:  Negative for nausea.   Genitourinary:  Negative for dysuria.   Musculoskeletal:  Negative for back pain.   Skin:  Negative for rash.   Neurological:  " Negative for weakness.   Hematological:  Does not bruise/bleed easily.   All other systems reviewed and are negative.      Physical Exam     Initial Vitals [01/17/24 2107]   BP Pulse Resp Temp SpO2   (!) 149/85 76 18 98.6 °F (37 °C) 99 %      MAP       --         Physical Exam    Nursing note and vitals reviewed.  Constitutional: She appears well-developed and well-nourished.   HENT:   Head: Normocephalic and atraumatic.   Eyes: Pupils are equal, round, and reactive to light.   Neck:   Normal range of motion.  Cardiovascular:  Normal rate and regular rhythm.           Pulmonary/Chest: Breath sounds normal.   Abdominal: Abdomen is soft. Bowel sounds are normal.   Musculoskeletal:         General: Normal range of motion.      Cervical back: Normal range of motion.     Neurological: She is alert and oriented to person, place, and time.   Skin: Skin is warm and dry.   Psychiatric: She has a normal mood and affect.         ED Course   Procedures  Labs Reviewed   SARS-COV-2 RDRP GENE - Abnormal; Notable for the following components:       Result Value    POC Rapid COVID Positive (*)     All other components within normal limits   POCT INFLUENZA A/B MOLECULAR          Imaging Results    None          Medications - No data to display  Medical Decision Making  Amount and/or Complexity of Data Reviewed  Labs: ordered.                                      Clinical Impression:  Final diagnoses:  [U07.1] COVID (Primary)          ED Disposition Condition    Discharge Stable          ED Prescriptions    None       Follow-up Information       Follow up With Specialties Details Why Contact [facility] Psychology, Internal Medicine, Gynecology, Dental General Practice  As needed [address] [facility] [address]  [phone]               Johana Acuna NP  01/24/24 1042

## 2024-02-05 ENCOUNTER — HOSPITAL ENCOUNTER (OUTPATIENT)
Dept: RADIOLOGY | Facility: HOSPITAL | Age: 49
Discharge: HOME | End: 2024-02-05
Attending: NURSE PRACTITIONER
Payer: MEDICAID

## 2024-02-05 VITALS — HEIGHT: 67 IN | WEIGHT: 176 LBS | BODY MASS INDEX: 27.62 KG/M2

## 2024-02-05 DIAGNOSIS — Z12.31 ENCOUNTER FOR SCREENING MAMMOGRAM FOR BREAST CANCER: ICD-10-CM

## 2024-02-05 PROCEDURE — 77067 SCR MAMMO BI INCL CAD: CPT | Mod: TC

## 2024-02-14 ENCOUNTER — HOSPITAL ENCOUNTER (EMERGENCY)
Facility: HOSPITAL | Age: 49
Discharge: HOME | End: 2024-02-14
Attending: FAMILY MEDICINE
Payer: MEDICAID

## 2024-02-14 VITALS
DIASTOLIC BLOOD PRESSURE: 83 MMHG | WEIGHT: 175 LBS | TEMPERATURE: 98 F | SYSTOLIC BLOOD PRESSURE: 144 MMHG | HEART RATE: 66 BPM | OXYGEN SATURATION: 100 % | BODY MASS INDEX: 27.47 KG/M2 | RESPIRATION RATE: 18 BRPM | HEIGHT: 67 IN

## 2024-02-14 DIAGNOSIS — H53.8 BLURRED VISION: ICD-10-CM

## 2024-02-14 DIAGNOSIS — G43.009 MIGRAINE WITHOUT AURA AND WITHOUT STATUS MIGRAINOSUS, NOT INTRACTABLE: Primary | ICD-10-CM

## 2024-02-14 LAB
ALBUMIN SERPL BCP-MCNC: 3.3 G/DL (ref 3.5–5.2)
ALP SERPL-CCNC: 110 U/L (ref 55–135)
ALT SERPL W/O P-5'-P-CCNC: 18 U/L (ref 10–44)
ANION GAP SERPL CALC-SCNC: 2 MMOL/L (ref 3–11)
AST SERPL-CCNC: 11 U/L (ref 10–40)
BASOPHILS # BLD AUTO: 0.05 K/UL (ref 0–0.2)
BASOPHILS NFR BLD: 1 % (ref 0–1.9)
BILIRUB SERPL-MCNC: 0.7 MG/DL (ref 0.1–1)
BILIRUB UR QL STRIP: NEGATIVE
BUN SERPL-MCNC: 9 MG/DL (ref 6–20)
CALCIUM SERPL-MCNC: 8.8 MG/DL (ref 8.7–10.5)
CHLORIDE SERPL-SCNC: 113 MMOL/L (ref 95–110)
CLARITY UR: CLEAR
CO2 SERPL-SCNC: 27 MMOL/L (ref 23–29)
COLOR UR: YELLOW
CREAT SERPL-MCNC: 1.1 MG/DL (ref 0.5–1.4)
DIFFERENTIAL METHOD BLD: ABNORMAL
EOSINOPHIL # BLD AUTO: 0.1 K/UL (ref 0–0.5)
EOSINOPHIL NFR BLD: 1.4 % (ref 0–8)
ERYTHROCYTE [DISTWIDTH] IN BLOOD BY AUTOMATED COUNT: 11.9 % (ref 11.5–14.5)
EST. GFR  (NO RACE VARIABLE): >60 ML/MIN/1.73 M^2
GLUCOSE SERPL-MCNC: 108 MG/DL (ref 70–110)
GLUCOSE UR QL STRIP: NEGATIVE
HCT VFR BLD AUTO: 43.8 % (ref 37–48.5)
HGB BLD-MCNC: 14.5 G/DL (ref 12–16)
HGB UR QL STRIP: NEGATIVE
IMM GRANULOCYTES # BLD AUTO: 0.01 K/UL (ref 0–0.04)
IMM GRANULOCYTES NFR BLD AUTO: 0.2 % (ref 0–0.5)
KETONES UR QL STRIP: NEGATIVE
LEUKOCYTE ESTERASE UR QL STRIP: NEGATIVE
LYMPHOCYTES # BLD AUTO: 2.8 K/UL (ref 1–4.8)
LYMPHOCYTES NFR BLD: 55.1 % (ref 18–48)
MCH RBC QN AUTO: 29.4 PG (ref 27–31)
MCHC RBC AUTO-ENTMCNC: 33.1 G/DL (ref 32–36)
MCV RBC AUTO: 89 FL (ref 82–98)
MONOCYTES # BLD AUTO: 0.4 K/UL (ref 0.3–1)
MONOCYTES NFR BLD: 8.3 % (ref 4–15)
NEUTROPHILS # BLD AUTO: 1.7 K/UL (ref 1.8–7.7)
NEUTROPHILS NFR BLD: 34 % (ref 38–73)
NITRITE UR QL STRIP: NEGATIVE
NRBC BLD-RTO: 0 /100 WBC
OHS QRS DURATION: 90 MS
OHS QTC CALCULATION: 386 MS
PH UR STRIP: 6 [PH] (ref 5–8)
PLATELET # BLD AUTO: 258 K/UL (ref 150–450)
PMV BLD AUTO: 9.7 FL (ref 9.2–12.9)
POTASSIUM SERPL-SCNC: 3.8 MMOL/L (ref 3.5–5.1)
PROT SERPL-MCNC: 7.4 G/DL (ref 6–8.4)
PROT UR QL STRIP: NEGATIVE
RBC # BLD AUTO: 4.93 M/UL (ref 4–5.4)
SODIUM SERPL-SCNC: 142 MMOL/L (ref 136–145)
SP GR UR STRIP: 1.01 (ref 1–1.03)
TROPONIN I SERPL DL<=0.01 NG/ML-MCNC: 5.4 PG/ML (ref 0–60)
URN SPEC COLLECT METH UR: NORMAL
UROBILINOGEN UR STRIP-ACNC: 1 EU/DL
WBC # BLD AUTO: 5.08 K/UL (ref 3.9–12.7)

## 2024-02-14 PROCEDURE — 25000003 PHARM REV CODE 250

## 2024-02-14 PROCEDURE — 93005 ELECTROCARDIOGRAM TRACING: CPT

## 2024-02-14 PROCEDURE — 84484 ASSAY OF TROPONIN QUANT: CPT

## 2024-02-14 PROCEDURE — 99285 EMERGENCY DEPT VISIT HI MDM: CPT | Mod: 25

## 2024-02-14 PROCEDURE — 63600175 PHARM REV CODE 636 W HCPCS

## 2024-02-14 PROCEDURE — 81003 URINALYSIS AUTO W/O SCOPE: CPT

## 2024-02-14 PROCEDURE — 36415 COLL VENOUS BLD VENIPUNCTURE: CPT

## 2024-02-14 PROCEDURE — 93010 ELECTROCARDIOGRAM REPORT: CPT | Mod: ,,, | Performed by: INTERNAL MEDICINE

## 2024-02-14 PROCEDURE — 80053 COMPREHEN METABOLIC PANEL: CPT

## 2024-02-14 PROCEDURE — 85025 COMPLETE CBC W/AUTO DIFF WBC: CPT

## 2024-02-14 PROCEDURE — 96372 THER/PROPH/DIAG INJ SC/IM: CPT

## 2024-02-14 RX ORDER — BUTALBITAL, ACETAMINOPHEN AND CAFFEINE 50; 325; 40 MG/1; MG/1; MG/1
1 TABLET ORAL EVERY 6 HOURS PRN
Qty: 15 TABLET | Refills: 0 | Status: SHIPPED | OUTPATIENT
Start: 2024-02-14

## 2024-02-14 RX ORDER — KETOROLAC TROMETHAMINE 30 MG/ML
30 INJECTION, SOLUTION INTRAMUSCULAR; INTRAVENOUS
Status: COMPLETED | OUTPATIENT
Start: 2024-02-14 | End: 2024-02-14

## 2024-02-14 RX ORDER — BUTALBITAL, ACETAMINOPHEN AND CAFFEINE 50; 325; 40 MG/1; MG/1; MG/1
1 TABLET ORAL
Status: COMPLETED | OUTPATIENT
Start: 2024-02-14 | End: 2024-02-14

## 2024-02-14 RX ORDER — BUTALBITAL, ACETAMINOPHEN AND CAFFEINE 50; 325; 40 MG/1; MG/1; MG/1
1 TABLET ORAL EVERY 6 HOURS PRN
Qty: 15 TABLET | Refills: 0 | Status: SHIPPED | OUTPATIENT
Start: 2024-02-14 | End: 2024-02-14

## 2024-02-14 RX ADMIN — KETOROLAC TROMETHAMINE 30 MG: 30 INJECTION, SOLUTION INTRAMUSCULAR; INTRAVENOUS at 07:02

## 2024-02-14 RX ADMIN — BUTALBITAL, ACETAMINOPHEN, AND CAFFEINE 1 TABLET: 325; 50; 40 TABLET ORAL at 08:02

## 2024-02-14 NOTE — ED PROVIDER NOTES
Encounter Date: 2/14/2024       History     Chief Complaint   Patient presents with    Headache     Pt stated that she began experiencing left sided headache / blurry vision with left arm pain since earlier today.      This note is dictated on M*Modal word recognition program.  There are word recognition mistakes and grammatical errors that are occasionally missed on review.     Noah Lantigua is a 48 y.o. female presents to ER today with complaints of headache, to the left side of her head that has been present since earlier today.  Patient also reports blurry vision to both eyes and left arm heaviness.  Patient reports she has a history of cholesterol problems and is on an injection for cholesterol every 2 weeks that is managed by [facility] cardiology.  Patient reports she does have a history of migraines in the past however she never had these associated symptoms with the migraine.  Patient reports headache has slacked off since arriving to ER.  Currently rates pain 3/10.      The history is provided by the patient.     Review of patient's allergies indicates:  No Known Allergies  Past Medical History:   Diagnosis Date    Allergic rhinitis     Anxiety     Chronic constipation     sees Dr. Martinez, been scoped x1    Colitis     Depression     GERD (gastroesophageal reflux disease)     History of colonoscopy     Migraine headache      Past Surgical History:   Procedure Laterality Date    COLONOSCOPY      HYSTERECTOMY      OOPHORECTOMY      UPPER GASTROINTESTINAL ENDOSCOPY       Family History   Problem Relation Age of Onset    No Known Problems Mother     Diabetes Father     Cancer Father     No Known Problems Sister     No Known Problems Daughter     No Known Problems Maternal Aunt     No Known Problems Maternal Uncle     No Known Problems Paternal Aunt     No Known Problems Paternal Uncle     No Known Problems Maternal Grandmother     No Known Problems Maternal Grandfather     No Known Problems Paternal Grandmother      No Known Problems Paternal Grandfather     No Known Problems Other     Breast cancer Neg Hx     Ovarian cancer Neg Hx     BRCA 1/2 Neg Hx      Social History     Tobacco Use    Smoking status: Never    Smokeless tobacco: Never   Substance Use Topics    Alcohol use: Yes     Comment: Social    Drug use: No     Review of Systems   Constitutional: Negative.    HENT: Negative.     Eyes:  Positive for photophobia and visual disturbance.   Respiratory: Negative.     Cardiovascular: Negative.    Gastrointestinal: Negative.    Endocrine: Negative.    Genitourinary: Negative.    Musculoskeletal: Negative.         Left arm heaviness reported.   Skin: Negative.    Neurological:  Positive for headaches.   Hematological: Negative.    Psychiatric/Behavioral: Negative.         Physical Exam     Initial Vitals [02/14/24 1752]   BP Pulse Resp Temp SpO2   (!) 144/83 66 18 97.8 °F (36.6 °C) 100 %      MAP       --         Physical Exam    Constitutional: She appears well-developed and well-nourished. She is not diaphoretic. No distress.   Musculoskeletal:         General: No tenderness or edema.     Neurological: She is oriented to person, place, and time. She has normal strength and normal reflexes. GCS score is 15. GCS eye subscore is 4. GCS verbal subscore is 5. GCS motor subscore is 6.   Skin: Capillary refill takes less than 2 seconds. No rash and no abscess noted. No erythema. No pallor.   Psychiatric: She has a normal mood and affect. Thought content normal.         ED Course   Procedures  Labs Reviewed   CBC W/ AUTO DIFFERENTIAL - Abnormal; Notable for the following components:       Result Value    Gran # (ANC) 1.7 (*)     Gran % 34.0 (*)     Lymph % 55.1 (*)     All other components within normal limits   COMPREHENSIVE METABOLIC PANEL - Abnormal; Notable for the following components:    Chloride 113 (*)     Albumin 3.3 (*)     Anion Gap 2 (*)     All other components within normal limits   TROPONIN I HIGH SENSITIVITY    URINALYSIS, REFLEX TO URINE CULTURE    Narrative:     Preferred Collection Type->Urine, Clean Catch  Specimen Source->Urine     EKG Readings: (Independently Interpreted)   Initial Reading: No STEMI. Rhythm: Sinus Bradycardia. Ectopy: No Ectopy. Axis: Normal. Clinical Impression: Sinus Bradycardia     ECG Results              EKG 12-lead (Final result)        Collection Time Result Time QRS Duration OHS QTC Calculation    02/14/24 18:19:26 02/14/24 20:15:46 90 386                     Final result by Interface, Lab In [facility] (02/14/24 20:15:51)                   Narrative:    Test Reason : H53.8,    Vent. Rate : 059 BPM     Atrial Rate : 059 BPM     P-R Int : 192 ms          QRS Dur : 090 ms      QT Int : 390 ms       P-R-T Axes : 024 073 060 degrees     QTc Int : 386 ms    Sinus bradycardia  Otherwise normal ECG  When compared with ECG of 22-JAN-2020 23:26,  No significant change was found  Confirmed by Ascencion MOSQUEDA MD ([id number]) on 2/14/2024 8:15:45 PM    Referred By: [email or web address]   SELF           Confirmed By:Ascencion MOSQUEDA MD                                  Imaging Results              CT Head Without Contrast (Final result)  Result time 02/14/24 19:17:53      Final result by Brenda Irizarry MD (02/14/24 19:17:53)                   Impression:      No acute intracranial abnormalities      Electronically signed by: Brenda Irizarry MD  Date:    02/14/2024  Time:    19:17               Narrative:    EXAMINATION:  CT HEAD WITHOUT CONTRAST    CLINICAL HISTORY:  Headache, chronic, new features or increased frequency;    CT/Cardiac Nuclear exams in prior 12 months: 0    TECHNIQUE:  Axial head CT performed without IV contrast.  Iterative reconstruction utilized.    COMPARISON:  CT head 06/17/2022    FINDINGS:  No evidence of intracranial hemorrhage, mass or hydrocephalus.  Mucus/secretions within sphenoid sinus.  Mastoids and middle ear cavities are clear.                                       Medications   ketorolac injection  30 mg (30 mg Intramuscular Given 2/14/24 1949)   butalbital-acetaminophen-caffeine -40 mg per tablet 1 tablet (1 tablet Oral Given 2/14/24 2018)     Medical Decision Making  Differential diagnosis includes CVA, TIA, complex migraine, hyperglycemia, migraine headache    Urinalysis reveals no acute urinary tract infection.  Troponin within normal limits, EKG reveals no acute STEMI.  CBC reveals no acute abnormality no overt anemia.  CT head reveals no acute intracranial abnormalities.  CVA felt less likely with negative workup at this time.  Patient's symptoms could be consistent with complicated/complex migraine.  Will treat headache pain.  Vital signs hemodynamically stable.  Patient stable at time of discharge in no acute distress.  No life-threatening illnesses were found during ER visit today.  Patient was instructed to follow-up with PCP or other recommended specialist within the next 48-72 hours.  Patient was instructed to return to ER immediately for any worsening or concerning symptoms.  All discharge instructions discussed with patient, and patient agrees to comply with discharge instructions given today.     Amount and/or Complexity of Data Reviewed  Labs: ordered.  Radiology: ordered.    Risk  Prescription drug management.                                      Clinical Impression:  Final diagnoses:  [H53.8] Blurred vision  [G43.009] Migraine without aura and without status migrainosus, not intractable (Primary)          ED Disposition Condition    Discharge Stable          ED Prescriptions       Medication Sig Dispense Start Date End Date Auth. Provider    butalbital-acetaminophen-caffeine -40 mg (FIORICET, ESGIC) -40 mg per tablet  (Status: Discontinued) Take 1 tablet by mouth every 6 (six) hours as needed for Pain or Headaches. 15 tablet 2/14/2024 2/14/2024 Alvarez Eastman, NABILA    butalbital-acetaminophen-caffeine -40 mg (FIORICET, ESGIC) -40 mg per tablet Take 1 tablet by mouth  every 6 (six) hours as needed for Pain or Headaches. 15 tablet 2/14/2024 -- Alvarez Eastman NP          Follow-up Information    None          Alvarez Eastman NP  02/14/24 2019

## 2024-04-11 ENCOUNTER — TELEPHONE (OUTPATIENT)
Dept: SURGERY | Facility: CLINIC | Age: 49
End: 2024-04-11
Payer: MEDICAID

## 2024-04-11 NOTE — TELEPHONE ENCOUNTER
LM to remind pt of her appt on tomorrow with Dr. Alvarez at 10:40am. Call back number and location provided.

## 2024-04-12 ENCOUNTER — OFFICE VISIT (OUTPATIENT)
Dept: SURGERY | Facility: CLINIC | Age: 49
End: 2024-04-12
Payer: MEDICAID

## 2024-04-12 VITALS
HEIGHT: 67 IN | WEIGHT: 177.94 LBS | HEART RATE: 64 BPM | BODY MASS INDEX: 27.93 KG/M2 | SYSTOLIC BLOOD PRESSURE: 123 MMHG | DIASTOLIC BLOOD PRESSURE: 86 MMHG

## 2024-04-12 DIAGNOSIS — K59.00 CONSTIPATION, UNSPECIFIED CONSTIPATION TYPE: ICD-10-CM

## 2024-04-12 DIAGNOSIS — K21.00 GASTROESOPHAGEAL REFLUX DISEASE WITH ESOPHAGITIS, UNSPECIFIED WHETHER HEMORRHAGE: Primary | ICD-10-CM

## 2024-04-12 PROCEDURE — 99204 OFFICE O/P NEW MOD 45 MIN: CPT | Mod: S$PBB,,, | Performed by: STUDENT IN AN ORGANIZED HEALTH CARE EDUCATION/TRAINING PROGRAM

## 2024-04-12 PROCEDURE — 3074F SYST BP LT 130 MM HG: CPT | Mod: CPTII,,, | Performed by: STUDENT IN AN ORGANIZED HEALTH CARE EDUCATION/TRAINING PROGRAM

## 2024-04-12 PROCEDURE — 3079F DIAST BP 80-89 MM HG: CPT | Mod: CPTII,,, | Performed by: STUDENT IN AN ORGANIZED HEALTH CARE EDUCATION/TRAINING PROGRAM

## 2024-04-12 PROCEDURE — 99999 PR PBB SHADOW E&M-EST. PATIENT-LVL V: CPT | Mod: PBBFAC,,, | Performed by: STUDENT IN AN ORGANIZED HEALTH CARE EDUCATION/TRAINING PROGRAM

## 2024-04-12 PROCEDURE — 1159F MED LIST DOCD IN RCRD: CPT | Mod: CPTII,,, | Performed by: STUDENT IN AN ORGANIZED HEALTH CARE EDUCATION/TRAINING PROGRAM

## 2024-04-12 PROCEDURE — 3008F BODY MASS INDEX DOCD: CPT | Mod: CPTII,,, | Performed by: STUDENT IN AN ORGANIZED HEALTH CARE EDUCATION/TRAINING PROGRAM

## 2024-04-12 PROCEDURE — 99215 OFFICE O/P EST HI 40 MIN: CPT | Mod: PBBFAC | Performed by: STUDENT IN AN ORGANIZED HEALTH CARE EDUCATION/TRAINING PROGRAM

## 2024-04-12 NOTE — PROGRESS NOTES
[facility]  Colon and Rectal Surgery    [address]  [place], [place]  Tel: [phone]  Fax: [phone]  [email or web address]   MD Murali Castaneda MD Brian Kann, MD W. Forrest Johnston, MD Matthew Giglia, MD Jennifer Paruch, MD William Kethman, MD Danielle Kay, MD     Patient name: Noah Lantigua   YOB: 1975   MRN: [id number]    Dear Ms. Bautista,    It was a pleasure seeing Ms. Lantigua in the Colon and Rectal Surgery clinic here at [facility].     As you know, Ms. Lantigua is a 48 year old woman with a history of anxiety, depression, GERD, and constipation  who presents for evaluation of constipation. She was recently seen by Ms. Bautista and Dr. Alonso - she notes bowel movements essentially once a month with straining and incomplete emptying despite Miralax, has tried Amitiza, Trulance, and Linzess (290 mcg) without improvement in symptoms. She was recently prescribed Lactulose which induces abdominal cramping. She also complains of post-prandial regurgitation and belching with daily PPI and H2 blocker. She denies any family history of CRC. She has undergone a recently EGD/colonoscopy with Dr. Martinez - we do not have these records. She has not had lifelong constipation - she notes that this occurred mostly after her hysterectomy when she was around 27 years old (she did not have symptoms as a child). She is currently mostly only taking Miralax for management of her symptoms - if she takes Miralax she has bowel movements about 2-3 times per week. If she doesn't take anything she will have bowel movements once per month. She has had unwanted penetrative anorectal intercourse but this was when she was much younger and did not exacerbate her symptoms. She does feel like she has to strain to have bowel movements and they will be hard, even with Miralax. She does not take fiber. Her most bothersome symptoms are upper epigastric abdominal pain and  cramping. She has not undergone a formal workup of these symptoms.    Ca 8.8    CTA AP - 6/18/2023  The colon is mostly collapsed limiting assessment.  However, mild diffuse wall thickening of the descending and sigmoid colon as well as near the terminal ileum and cecum are noted possibly reflecting colitis.     Pathology - 9/2020      The patient was informed of the availability of a certified  without charge. A certified  was not necessary for this visit.    Review of Systems  See pertinent review of systems above    Past Medical History:   Diagnosis Date    Allergic rhinitis     Anxiety     Chronic constipation     sees Dr. Martinez, been scoped x1    Colitis     Depression     GERD (gastroesophageal reflux disease)     History of colonoscopy     Migraine headache      Past Surgical History:   Procedure Laterality Date    COLONOSCOPY      HYSTERECTOMY      OOPHORECTOMY      UPPER GASTROINTESTINAL ENDOSCOPY       Family History   Problem Relation Age of Onset    No Known Problems Mother     Diabetes Father     Cancer Father     No Known Problems Sister     No Known Problems Daughter     No Known Problems Maternal Aunt     No Known Problems Maternal Uncle     No Known Problems Paternal Aunt     No Known Problems Paternal Uncle     No Known Problems Maternal Grandmother     No Known Problems Maternal Grandfather     No Known Problems Paternal Grandmother     No Known Problems Paternal Grandfather     No Known Problems Other     Breast cancer Neg Hx     Ovarian cancer Neg Hx     BRCA 1/2 Neg Hx      Social History     Tobacco Use    Smoking status: Never    Smokeless tobacco: Never   Substance Use Topics    Alcohol use: Yes     Comment: Social    Drug use: No     Review of patient's allergies indicates:  No Known Allergies    Current Outpatient Medications on File Prior to Visit   Medication Sig Dispense Refill    aspirin (ECOTRIN) 81 MG EC tablet Take 81 mg by mouth once daily.       atorvastatin (LIPITOR) 20 MG tablet Take 20 mg by mouth once daily.      brompheniramine-pseudoeph-DM (BROMFED DM) 2-30-10 mg/5 mL Syrp TAKE 10 ML BY MOUTH EVERY 6 HOURS AS NEEDED FOR COUGH AND CONGESTION      buPROPion (WELLBUTRIN XL) 150 MG TB24 tablet Take 150 mg by mouth every morning.      butalbital-acetaminophen-caffeine -40 mg (FIORICET, ESGIC) -40 mg per tablet Take 1 tablet by mouth every 6 (six) hours as needed for Pain or Headaches. (Patient not taking: Reported on 3/6/2024) 15 tablet 0    clonazePAM (KLONOPIN) 0.5 MG tablet TK 1 T PO ONCE D PRA      cyclobenzaprine (FLEXERIL) 10 MG tablet 1 tablet at bedtime as needed Orally Once a day      cyproheptadine (PERIACTIN) 4 mg tablet Take 4 mg by mouth 3 (three) times daily as needed.      diclofenac (VOLTAREN) 75 MG EC tablet Take 1 tablet (75 mg total) by mouth 2 (two) times daily as needed. (Patient not taking: Reported on 6/23/2023) 10 tablet 0    dicyclomine (BENTYL) 10 MG capsule TAKE ONE CAPSULE BY MOUTH FOUR TIMES DAILY BEFORE MEALS AND NIGHTLY (Patient not taking: Reported on 6/23/2023) 120 capsule 0    docusate sodium (COLACE) 100 MG capsule Doc-Q-Lace 100 mg capsule   TK 1 C PO BID      EFFEXOR XR 37.5 mg 24 hr capsule Take 37.5 mg by mouth once daily.      estradiol (ESTRACE) 2 MG tablet Take 2 mg by mouth once daily.      ezetimibe (ZETIA) 10 mg tablet ezetimibe 10 mg tablet   TK 1 T PO QD      famotidine (PEPCID) 40 MG tablet Take 40 mg by mouth once daily.      fluconazole (DIFLUCAN) 150 MG Tab fluconazole 150 mg tablet      fluocinonide (LIDEX) 0.05 % external solution fluocinonide 0.05 % topical solution      gabapentin (NEURONTIN) 300 MG capsule gabapentin 300 mg capsule, [RxNorm: 756666]      hydrOXYzine HCL (ATARAX) 25 MG tablet Take 25 mg by mouth 3 (three) times daily.      ibuprofen (ADVIL,MOTRIN) 800 MG tablet Take 1 tablet (800 mg total) by mouth every 6 (six) hours as needed for Pain. (Patient not taking: Reported on  3/6/2024) 20 tablet 0    LIDOcaine (LIDODERM) 5 % Place 1 patch onto the skin once daily. Remove & Discard patch within 12 hours or as directed by MD (Patient not taking: Reported on 3/6/2024) 20 patch 0    lorazepam (ATIVAN) 0.5 MG tablet Take 0.5 mg by mouth every 6 (six) hours as needed for Anxiety.      montelukast (SINGULAIR) 10 mg tablet Take 10 mg by mouth every evening.      naproxen (NAPROSYN) 500 MG tablet Take 1 tablet (500 mg total) by mouth every 12 (twelve) hours as needed (Pain). (Patient not taking: Reported on 2023) 20 tablet 0    pantoprazole (PROTONIX) 40 MG tablet Take 1 tablet (40 mg total) by mouth once daily. 30 tablet 11    polyethylene glycol (GLYCOLAX) 17 gram/dose powder Take 17 g by mouth once daily. (Patient not taking: Reported on 2023) 507 g 0    predniSONE (DELTASONE) 10 MG tablet Take 10 mg by mouth once daily.      progesterone (PROMETRIUM) 100 MG capsule Take 100 mg by mouth once daily.      QUEtiapine (SEROQUEL) 50 MG tablet Take 50 mg by mouth every evening.      REPATHA SURECLICK 140 mg/mL PnIj SMARTSI Milligram(s) SUB-Q Every 2 Weeks      rosuvastatin (CRESTOR) 40 MG Tab TAKE 1 TABLET BY MOUTH 1 TIME IN THE EVENING      sorbitoL 70 % solution Take 15 mLs by mouth every 72 hours as needed (Breakthrough constipation not responsive to other medications). (Patient not taking: Reported on 2023) 473 mL 0    topiramate (TOPAMAX) 50 MG tablet topiramate 50 mg tablet   Take 1 tablet twice a day by oral route as directed for 30 days.      [DISCONTINUED] oxybutynin (DITROPAN XL) 15 MG TR24 oxybutynin chloride ER 15 mg tablet,extended release 24 hr   TK 1 T PO QD UTD       No current facility-administered medications on file prior to visit.     Physical Examination  There were no vitals taken for this visit.     A chaperone was present for the physical examination.    Constitutional: well developed, no cough, no dyspnea, alert, and no acute distress    Head:  Normocephalic, no lesions, without obvious abnormality  Eye: Normal external eye, conjunctiva, and lids  Cardiovascular: regular rate and regular rhythm  Respiratory: normal air entry  Gastrointestinal: soft, non-tender    Perianal Skin: Normal  Digital Rectal Exam:  Normal resting tone  Normal squeeze pressure   Relaxation with bear down is present  Mass(es) appreciated: none    Musculoskeletal: full range of motion without pain  Neurologic: alert, oriented, normal speech, no focal findings or movement disorder noted  Psychiatric: appropriate, normal mood    Assessment and Plan of Care    Thank you again for referring Ms. Lantigua to my care. In summary, Ms. Lantigua is a 48 year old woman presenting with constipation - normal functional exam today.     We discussed constipation and clinical subtypes and potential etiologies for their symptoms. We discussed slow transit constipation - diagnosed by abnormal sitz marker evaluation, obstructed or dyssynergic constipation - inability or difficulty evacuating bowel movements - diagnosed or evaluated with MR defecography or fluoroscopic defecography, mixed-type (common combination of slow transit and obstructed defecation), and normal transit functional constipation.     We had a discussion about conservative management options for constipation management. We discussed the role of diet and common medications that contribute to constipation. I have recommended Miralax and hydration (1.5 - 2 L daily) as first line therapy. I have also recommended regular exercise, avoiding constipating medications (narcotic pain medications as an example), limiting alcohol and fatty foods, reducing time on the toilet and focusing on not straining.    Recommendations:    Miralax - discussed titration to ideal function  Ordered TSH  Given treatment resistant nature of her disease, I have recommended objective assessment - sitz marker assessment (can continue Miralax) and a gastric emptying study  (due to reported symptoms) - will consider anorectal manometry and defecography based on symptoms  Follow-up in 2 months in [place]    Please do not hesitate to contact me if you have any questions.      Fabio Alvarez MD, FACS, FASCRS  Department of Colon & Rectal Surgery  [facility]

## 2024-04-15 ENCOUNTER — HOSPITAL ENCOUNTER (OUTPATIENT)
Dept: RADIOLOGY | Facility: HOSPITAL | Age: 49
Discharge: HOME | End: 2024-04-15
Attending: STUDENT IN AN ORGANIZED HEALTH CARE EDUCATION/TRAINING PROGRAM
Payer: MEDICAID

## 2024-04-15 DIAGNOSIS — K59.00 CONSTIPATION, UNSPECIFIED CONSTIPATION TYPE: ICD-10-CM

## 2024-04-15 PROCEDURE — 74018 RADEX ABDOMEN 1 VIEW: CPT | Mod: TC

## 2024-04-19 ENCOUNTER — HOSPITAL ENCOUNTER (OUTPATIENT)
Dept: RADIOLOGY | Facility: HOSPITAL | Age: 49
Discharge: HOME | End: 2024-04-19
Attending: STUDENT IN AN ORGANIZED HEALTH CARE EDUCATION/TRAINING PROGRAM
Payer: MEDICAID

## 2024-04-19 DIAGNOSIS — K59.00 CONSTIPATION, UNSPECIFIED CONSTIPATION TYPE: ICD-10-CM

## 2024-04-19 PROCEDURE — 74018 RADEX ABDOMEN 1 VIEW: CPT | Mod: TC

## 2024-04-23 ENCOUNTER — HOSPITAL ENCOUNTER (OUTPATIENT)
Dept: RADIOLOGY | Facility: HOSPITAL | Age: 49
Discharge: HOME | End: 2024-04-23
Attending: STUDENT IN AN ORGANIZED HEALTH CARE EDUCATION/TRAINING PROGRAM
Payer: MEDICAID

## 2024-04-23 DIAGNOSIS — K21.00 GASTROESOPHAGEAL REFLUX DISEASE WITH ESOPHAGITIS, UNSPECIFIED WHETHER HEMORRHAGE: ICD-10-CM

## 2024-04-23 PROCEDURE — 78264 GASTRIC EMPTYING IMG STUDY: CPT | Mod: 26,,, | Performed by: STUDENT IN AN ORGANIZED HEALTH CARE EDUCATION/TRAINING PROGRAM

## 2024-04-23 PROCEDURE — A9541 TC99M SULFUR COLLOID: HCPCS | Performed by: STUDENT IN AN ORGANIZED HEALTH CARE EDUCATION/TRAINING PROGRAM

## 2024-04-23 PROCEDURE — 78264 GASTRIC EMPTYING IMG STUDY: CPT | Mod: TC

## 2024-04-23 RX ORDER — TECHNETIUM TC 99M SULFUR COLLOID 2 MG
1.1 KIT MISCELLANEOUS
Status: COMPLETED | OUTPATIENT
Start: 2024-04-23 | End: 2024-04-23

## 2024-04-23 RX ADMIN — TECHNETIUM TC 99M SULFUR COLLOID 1.1 MILLICURIE: KIT at 08:04

## 2024-06-19 NOTE — PROGRESS NOTES
[facility]  Colon and Rectal Surgery    [address]  [place], [place]  Tel: [phone]  Fax: [phone]  [email or web address]   MD Murali Castaneda MD Brian Kann, MD W. Forrest Johnston, MD Matthew Giglia, MD Jennifer Paruch, MD William Kethman, MD Danielle Kay, MD     Patient name: Noah Lantigua   YOB: 1975   MRN: [id number]    Dear Ms. Bautista,    It was a pleasure seeing Ms. Lantigua in the Colon and Rectal Surgery clinic here at [facility].     As you know, Ms. Lantigua is a 49 year old woman with a history of anxiety, depression, GERD, and constipation  who presents for evaluation of constipation. She was recently seen by Ms. Bautista and Dr. Alonso - she notes bowel movements essentially once a month with straining and incomplete emptying despite Miralax, has tried Amitiza, Trulance, and Linzess (290 mcg) without improvement in symptoms. She was recently prescribed Lactulose which induces abdominal cramping. She also complains of post-prandial regurgitation and belching with daily PPI and H2 blocker. She denies any family history of CRC. She has undergone a recently EGD/colonoscopy with Dr. Martinez - we do not have these records. She has not had lifelong constipation - she notes that this occurred mostly after her hysterectomy when she was around 27 years old (she did not have symptoms as a child). She is currently mostly only taking Miralax for management of her symptoms - if she takes Miralax she has bowel movements about 2-3 times per week. If she doesn't take anything she will have bowel movements once per month. She has had unwanted penetrative anorectal intercourse but this was when she was much younger and did not exacerbate her symptoms. She does feel like she has to strain to have bowel movements and they will be hard, even with Miralax. She does not take fiber. Her most bothersome symptoms are upper epigastric abdominal pain and  cramping. She has not undergone a formal workup of these symptoms.    Ca 8.8    CTA AP - 6/18/2023  The colon is mostly collapsed limiting assessment.  However, mild diffuse wall thickening of the descending and sigmoid colon as well as near the terminal ileum and cecum are noted possibly reflecting colitis.     Pathology - 9/2020 6/20/2024  After our last visit, we recommended further objective assessment of her symptoms. She had an abnormal sitz marker and gastric emptying study, TSH and Ca levels noted below. When she did the sitz marker study she was not on any medications. Since she has been staying more hydrated she has been having more regular bowel movements - about once per day. She does have nausea everyday - she does have regurgitation type symptoms. She typically sees Dr. Henning - she has had an EGD. Her predominant symptoms are bloating and cramping.    Current medications:  - She is not taking any medications daily  - Once per week she will take Miralax    Sitz marker study - 4/19/2024  Nonobstructive bowel gas pattern. Total of 21 Sitz markers are seen with 2 in the mid transverse colon and most in the distal transverse and proximal descending colon the couple scattered in the sigmoid colon. Large amount of stool     NM Gastric emptying - 4/23/2024  Delayed gastric emptying     4/12/2024 - TSH 1.626  5/6/2024 - Ca - 8.8    The patient was informed of the availability of a certified  without charge. A certified  was not necessary for this visit.    Review of Systems  See pertinent review of systems above    Past Medical History:   Diagnosis Date    Allergic rhinitis     Anxiety     Chronic constipation     sees Dr. Martinez, been scoped x1    Colitis     Depression     GERD (gastroesophageal reflux disease)     History of colonoscopy     Migraine headache      Past Surgical History:   Procedure Laterality Date    COLONOSCOPY      HYSTERECTOMY      OOPHORECTOMY      UPPER  GASTROINTESTINAL ENDOSCOPY       Family History   Problem Relation Name Age of Onset    No Known Problems Mother      Diabetes Father      Cancer Father      No Known Problems Sister      No Known Problems Daughter      No Known Problems Maternal Aunt      No Known Problems Maternal Uncle      No Known Problems Paternal Aunt      No Known Problems Paternal Uncle      No Known Problems Maternal Grandmother      No Known Problems Maternal Grandfather      No Known Problems Paternal Grandmother      No Known Problems Paternal Grandfather      No Known Problems Other      Breast cancer Neg Hx      Ovarian cancer Neg Hx      BRCA 1/2 Neg Hx       Social History     Tobacco Use    Smoking status: Never    Smokeless tobacco: Never   Substance Use Topics    Alcohol use: Yes     Comment: Social    Drug use: No     Review of patient's allergies indicates:  No Known Allergies    Current Outpatient Medications on File Prior to Visit   Medication Sig Dispense Refill    REPATHA SURECLICK 140 mg/mL PnIj       rosuvastatin (CRESTOR) 40 MG Tab TAKE 1 TABLET BY MOUTH 1 TIME IN THE EVENING      aspirin (ECOTRIN) 81 MG EC tablet Take 81 mg by mouth once daily. (Patient not taking: Reported on 4/12/2024)      atorvastatin (LIPITOR) 20 MG tablet Take 20 mg by mouth once daily. (Patient not taking: Reported on 4/12/2024)      brompheniramine-pseudoeph-DM (BROMFED DM) 2-30-10 mg/5 mL Syrp TAKE 10 ML BY MOUTH EVERY 6 HOURS AS NEEDED FOR COUGH AND CONGESTION (Patient not taking: Reported on 4/12/2024)      buPROPion (WELLBUTRIN XL) 150 MG TB24 tablet Take 150 mg by mouth every morning. (Patient not taking: Reported on 4/12/2024)      butalbital-acetaminophen-caffeine -40 mg (FIORICET, ESGIC) -40 mg per tablet Take 1 tablet by mouth every 6 (six) hours as needed for Pain or Headaches. (Patient not taking: Reported on 3/6/2024) 15 tablet 0    clonazePAM (KLONOPIN) 0.5 MG tablet TK 1 T PO ONCE D PRA (Patient not taking: Reported on  4/12/2024)      cyclobenzaprine (FLEXERIL) 10 MG tablet 1 tablet at bedtime as needed Orally Once a day (Patient not taking: Reported on 4/12/2024)      cyproheptadine (PERIACTIN) 4 mg tablet Take 4 mg by mouth 3 (three) times daily as needed. (Patient not taking: Reported on 4/12/2024)      diclofenac (VOLTAREN) 75 MG EC tablet Take 1 tablet (75 mg total) by mouth 2 (two) times daily as needed. (Patient not taking: Reported on 6/23/2023) 10 tablet 0    dicyclomine (BENTYL) 10 MG capsule TAKE ONE CAPSULE BY MOUTH FOUR TIMES DAILY BEFORE MEALS AND NIGHTLY (Patient not taking: Reported on 6/23/2023) 120 capsule 0    docusate sodium (COLACE) 100 MG capsule Doc-Q-Lace 100 mg capsule   TK 1 C PO BID (Patient not taking: Reported on 4/12/2024)      EFFEXOR XR 37.5 mg 24 hr capsule Take 37.5 mg by mouth once daily. (Patient not taking: Reported on 4/12/2024)      estradiol (ESTRACE) 2 MG tablet Take 2 mg by mouth once daily. (Patient not taking: Reported on 4/12/2024)      ezetimibe (ZETIA) 10 mg tablet ezetimibe 10 mg tablet   TK 1 T PO QD (Patient not taking: Reported on 4/12/2024)      famotidine (PEPCID) 40 MG tablet Take 40 mg by mouth once daily. (Patient not taking: Reported on 6/20/2024)      fluconazole (DIFLUCAN) 150 MG Tab fluconazole 150 mg tablet (Patient not taking: Reported on 4/12/2024)      fluocinonide (LIDEX) 0.05 % external solution fluocinonide 0.05 % topical solution (Patient not taking: Reported on 4/12/2024)      gabapentin (NEURONTIN) 300 MG capsule gabapentin 300 mg capsule, [RxNorm: 958583] (Patient not taking: Reported on 4/12/2024)      hydrOXYzine HCL (ATARAX) 25 MG tablet Take 25 mg by mouth 3 (three) times daily. (Patient not taking: Reported on 4/12/2024)      ibuprofen (ADVIL,MOTRIN) 800 MG tablet Take 1 tablet (800 mg total) by mouth every 6 (six) hours as needed for Pain. (Patient not taking: Reported on 3/6/2024) 20 tablet 0    LIDOcaine (LIDODERM) 5 % Place 1 patch onto the skin once  daily. Remove & Discard patch within 12 hours or as directed by MD (Patient not taking: Reported on 3/6/2024) 20 patch 0    lorazepam (ATIVAN) 0.5 MG tablet Take 0.5 mg by mouth every 6 (six) hours as needed for Anxiety. (Patient not taking: Reported on 4/12/2024)      montelukast (SINGULAIR) 10 mg tablet Take 10 mg by mouth every evening. (Patient not taking: Reported on 4/12/2024)      naproxen (NAPROSYN) 500 MG tablet Take 1 tablet (500 mg total) by mouth every 12 (twelve) hours as needed (Pain). (Patient not taking: Reported on 6/23/2023) 20 tablet 0    pantoprazole (PROTONIX) 40 MG tablet Take 1 tablet (40 mg total) by mouth once daily. (Patient not taking: Reported on 6/20/2024) 30 tablet 11    polyethylene glycol (GLYCOLAX) 17 gram/dose powder Take 17 g by mouth once daily. (Patient not taking: Reported on 6/23/2023) 507 g 0    predniSONE (DELTASONE) 10 MG tablet Take 10 mg by mouth once daily. (Patient not taking: Reported on 4/12/2024)      progesterone (PROMETRIUM) 100 MG capsule Take 100 mg by mouth once daily. (Patient not taking: Reported on 6/20/2024)      QUEtiapine (SEROQUEL) 50 MG tablet Take 50 mg by mouth every evening. (Patient not taking: Reported on 4/12/2024)      sorbitoL 70 % solution Take 15 mLs by mouth every 72 hours as needed (Breakthrough constipation not responsive to other medications). (Patient not taking: Reported on 6/23/2023) 473 mL 0    topiramate (TOPAMAX) 50 MG tablet topiramate 50 mg tablet   Take 1 tablet twice a day by oral route as directed for 30 days. (Patient not taking: Reported on 4/12/2024)      [DISCONTINUED] oxybutynin (DITROPAN XL) 15 MG TR24 oxybutynin chloride ER 15 mg tablet,extended release 24 hr   TK 1 T PO QD UTD       No current facility-administered medications on file prior to visit.     Physical Examination  There were no vitals taken for this visit.     A chaperone was present for the physical examination.    Constitutional: well developed, no cough, no  dyspnea, alert, and no acute distress    Head: Normocephalic, no lesions, without obvious abnormality  Eye: Normal external eye, conjunctiva, and lids  Cardiovascular: regular rate and regular rhythm  Respiratory: normal air entry  Gastrointestinal: soft, non-tender  Musculoskeletal: full range of motion without pain  Neurologic: alert, oriented, normal speech, no focal findings or movement disorder noted  Psychiatric: appropriate, normal mood    Assessment and Plan of Care    Thank you again for referring Ms. Lantigua to my care. In summary, Ms. Lantigua is a 49 year old woman presenting with constipation - normal functional clinical exam with, however, abnormal sitz marker and gastric emptying studies. Suspect she has a component of panintestinal dysmotility.     We previously discussed constipation and clinical subtypes and potential etiologies for their symptoms. We discussed slow transit constipation - diagnosed by abnormal sitz marker evaluation, obstructed or dyssynergic constipation - inability or difficulty evacuating bowel movements - diagnosed or evaluated with MR defecography or fluoroscopic defecography, mixed-type (common combination of slow transit and obstructed defecation), and normal transit functional constipation.     We had a discussion about conservative management options for constipation management. We discussed the role of diet and common medications that contribute to constipation. I have recommended Miralax and hydration (1.5 - 2 L daily) as first line therapy. I have also recommended regular exercise, avoiding constipating medications (narcotic pain medications as an example), limiting alcohol and fatty foods, reducing time on the toilet and focusing on not straining.    Recommendations:    Miralax - discussed titration to ideal function, she is having regular bowel movements and only takes this intermittently  Given treatment resistant nature of her disease and initial abnormal gastric emptying  and sitz marker study, I have recommended that she continue to be evaluated by Gastroenterology as I suspect that she would unlikely benefit from more aggressive surgical management. I have offered her a referral and provided more information to her about more specialized dysmotility expertise - information provided for [facility] specialists. This is unlikely accessible for her and she prefers to be first seen by one of the Gastroenterologists in [place], referral placed.    Please do not hesitate to contact me if you have any questions.      Fabio Alvarez MD, FACS, FASCRS  Department of Colon & Rectal Surgery  [facility]

## 2024-06-20 ENCOUNTER — OFFICE VISIT (OUTPATIENT)
Dept: SURGERY | Facility: CLINIC | Age: 49
End: 2024-06-20
Payer: MEDICAID

## 2024-06-20 VITALS — SYSTOLIC BLOOD PRESSURE: 108 MMHG | DIASTOLIC BLOOD PRESSURE: 68 MMHG

## 2024-06-20 DIAGNOSIS — K21.00 GASTROESOPHAGEAL REFLUX DISEASE WITH ESOPHAGITIS, UNSPECIFIED WHETHER HEMORRHAGE: Primary | ICD-10-CM

## 2024-06-20 DIAGNOSIS — K59.00 CONSTIPATION, UNSPECIFIED CONSTIPATION TYPE: ICD-10-CM

## 2024-06-20 PROCEDURE — 1159F MED LIST DOCD IN RCRD: CPT | Mod: CPTII,,, | Performed by: STUDENT IN AN ORGANIZED HEALTH CARE EDUCATION/TRAINING PROGRAM

## 2024-06-20 PROCEDURE — 99213 OFFICE O/P EST LOW 20 MIN: CPT | Mod: PBBFAC | Performed by: STUDENT IN AN ORGANIZED HEALTH CARE EDUCATION/TRAINING PROGRAM

## 2024-06-20 PROCEDURE — 99213 OFFICE O/P EST LOW 20 MIN: CPT | Mod: S$PBB,,, | Performed by: STUDENT IN AN ORGANIZED HEALTH CARE EDUCATION/TRAINING PROGRAM

## 2024-06-20 PROCEDURE — 99999 PR PBB SHADOW E&M-EST. PATIENT-LVL III: CPT | Mod: PBBFAC,,, | Performed by: STUDENT IN AN ORGANIZED HEALTH CARE EDUCATION/TRAINING PROGRAM

## 2024-06-21 ENCOUNTER — HOSPITAL ENCOUNTER (EMERGENCY)
Facility: HOSPITAL | Age: 49
Discharge: HOME | End: 2024-06-21
Attending: EMERGENCY MEDICINE
Payer: MEDICAID

## 2024-06-21 VITALS
RESPIRATION RATE: 18 BRPM | WEIGHT: 175 LBS | DIASTOLIC BLOOD PRESSURE: 81 MMHG | BODY MASS INDEX: 27.47 KG/M2 | HEART RATE: 65 BPM | SYSTOLIC BLOOD PRESSURE: 114 MMHG | TEMPERATURE: 98 F | HEIGHT: 67 IN | OXYGEN SATURATION: 97 %

## 2024-06-21 DIAGNOSIS — R07.9 CHEST PAIN: ICD-10-CM

## 2024-06-21 DIAGNOSIS — H92.03 OTALGIA OF BOTH EARS: Primary | ICD-10-CM

## 2024-06-21 LAB
OHS QRS DURATION: 88 MS
OHS QTC CALCULATION: 368 MS
TROPONIN I SERPL DL<=0.01 NG/ML-MCNC: 4.4 PG/ML (ref 0–60)

## 2024-06-21 PROCEDURE — 93010 ELECTROCARDIOGRAM REPORT: CPT | Mod: ,,, | Performed by: INTERNAL MEDICINE

## 2024-06-21 PROCEDURE — 93005 ELECTROCARDIOGRAM TRACING: CPT

## 2024-06-21 PROCEDURE — 84484 ASSAY OF TROPONIN QUANT: CPT | Performed by: EMERGENCY MEDICINE

## 2024-06-21 PROCEDURE — 36415 COLL VENOUS BLD VENIPUNCTURE: CPT | Performed by: EMERGENCY MEDICINE

## 2024-06-21 PROCEDURE — 99284 EMERGENCY DEPT VISIT MOD MDM: CPT | Mod: 25

## 2024-06-21 RX ORDER — PSEUDOEPHEDRINE HCL 30 MG
30 TABLET ORAL EVERY 4 HOURS PRN
Qty: 24 TABLET | Refills: 0 | Status: SHIPPED | OUTPATIENT
Start: 2024-06-21 | End: 2024-07-01

## 2024-06-21 RX ORDER — NAPROXEN 500 MG/1
500 TABLET ORAL EVERY 12 HOURS PRN
Qty: 20 TABLET | Refills: 0 | Status: SHIPPED | OUTPATIENT
Start: 2024-06-21

## 2024-06-21 RX ORDER — FLUTICASONE PROPIONATE 50 MCG
2 SPRAY, SUSPENSION (ML) NASAL 2 TIMES DAILY
Qty: 15.8 ML | Refills: 0 | Status: SHIPPED | OUTPATIENT
Start: 2024-06-21

## 2024-06-21 NOTE — ED PROVIDER NOTES
EMERGENCY DEPARTMENT HISTORY AND PHYSICAL EXAM     This note is dictated on M*Modal word recognition program.  There are word recognition mistakes and grammatical errors that are occasionally missed on review.     Date: 6/21/2024   Patient Name: Noah Lantigua       History of Presenting Illness      Chief Complaint   Patient presents with    Otalgia     Patient to the ER with complaints of bilateral ear pain.    Chest Pain     Patient to the ER with complaints of high blood pressure and substernal chest pain.           Noah Lantigua is a 49 y.o. female with PMHX of  anxiety, depression, GERD who presents to the emergency department C/O ear pain.    Patient reports bilateral ear pain.  Originally was mainly right ear and patient says she saw PCP last week and complete a course of antibiotics.  Now has pain in left ear.  She reports seasonal allergies and frequent sinus issues in congestion.  Patient also reports anterior chest pain.  Comes and goes.  She notes that it maybe to how she is lying down and states sometimes when she rolls in bed she feels it warm.  No shortness a breath.  Followed by Cardiology for a blockage in her neck.      PCP: [facility] [place]        No current facility-administered medications for this encounter.     Current Outpatient Medications   Medication Sig Dispense Refill    REPATHA SURECLICK 140 mg/mL PnIj       aspirin (ECOTRIN) 81 MG EC tablet Take 81 mg by mouth once daily. (Patient not taking: Reported on 4/12/2024)      atorvastatin (LIPITOR) 20 MG tablet Take 20 mg by mouth once daily. (Patient not taking: Reported on 4/12/2024)      brompheniramine-pseudoeph-DM (BROMFED DM) 2-30-10 mg/5 mL Syrp TAKE 10 ML BY MOUTH EVERY 6 HOURS AS NEEDED FOR COUGH AND CONGESTION (Patient not taking: Reported on 4/12/2024)      buPROPion (WELLBUTRIN XL) 150 MG TB24 tablet Take 150 mg by mouth every morning. (Patient not taking: Reported on 4/12/2024)       butalbital-acetaminophen-caffeine -40 mg (FIORICET, ESGIC) -40 mg per tablet Take 1 tablet by mouth every 6 (six) hours as needed for Pain or Headaches. (Patient not taking: Reported on 3/6/2024) 15 tablet 0    clonazePAM (KLONOPIN) 0.5 MG tablet TK 1 T PO ONCE D PRA (Patient not taking: Reported on 4/12/2024)      cyclobenzaprine (FLEXERIL) 10 MG tablet 1 tablet at bedtime as needed Orally Once a day (Patient not taking: Reported on 4/12/2024)      cyproheptadine (PERIACTIN) 4 mg tablet Take 4 mg by mouth 3 (three) times daily as needed. (Patient not taking: Reported on 4/12/2024)      diclofenac (VOLTAREN) 75 MG EC tablet Take 1 tablet (75 mg total) by mouth 2 (two) times daily as needed. (Patient not taking: Reported on 6/23/2023) 10 tablet 0    dicyclomine (BENTYL) 10 MG capsule TAKE ONE CAPSULE BY MOUTH FOUR TIMES DAILY BEFORE MEALS AND NIGHTLY (Patient not taking: Reported on 6/23/2023) 120 capsule 0    docusate sodium (COLACE) 100 MG capsule Doc-Q-Lace 100 mg capsule   TK 1 C PO BID (Patient not taking: Reported on 4/12/2024)      EFFEXOR XR 37.5 mg 24 hr capsule Take 37.5 mg by mouth once daily. (Patient not taking: Reported on 4/12/2024)      estradiol (ESTRACE) 2 MG tablet Take 2 mg by mouth once daily. (Patient not taking: Reported on 4/12/2024)      ezetimibe (ZETIA) 10 mg tablet ezetimibe 10 mg tablet   TK 1 T PO QD (Patient not taking: Reported on 4/12/2024)      famotidine (PEPCID) 40 MG tablet Take 40 mg by mouth once daily. (Patient not taking: Reported on 6/20/2024)      fluconazole (DIFLUCAN) 150 MG Tab fluconazole 150 mg tablet (Patient not taking: Reported on 4/12/2024)      fluocinonide (LIDEX) 0.05 % external solution fluocinonide 0.05 % topical solution (Patient not taking: Reported on 4/12/2024)      fluticasone propionate (FLONASE) 50 mcg/actuation nasal spray 2 sprays (100 mcg total) by Each Nostril route 2 (two) times a day. 15.8 mL 0    gabapentin (NEURONTIN) 300 MG capsule  gabapentin 300 mg capsule, [RxNorm: 527443] (Patient not taking: Reported on 4/12/2024)      hydrOXYzine HCL (ATARAX) 25 MG tablet Take 25 mg by mouth 3 (three) times daily. (Patient not taking: Reported on 4/12/2024)      ibuprofen (ADVIL,MOTRIN) 800 MG tablet Take 1 tablet (800 mg total) by mouth every 6 (six) hours as needed for Pain. (Patient not taking: Reported on 3/6/2024) 20 tablet 0    LIDOcaine (LIDODERM) 5 % Place 1 patch onto the skin once daily. Remove & Discard patch within 12 hours or as directed by MD (Patient not taking: Reported on 3/6/2024) 20 patch 0    lorazepam (ATIVAN) 0.5 MG tablet Take 0.5 mg by mouth every 6 (six) hours as needed for Anxiety. (Patient not taking: Reported on 4/12/2024)      montelukast (SINGULAIR) 10 mg tablet Take 10 mg by mouth every evening. (Patient not taking: Reported on 4/12/2024)      naproxen (NAPROSYN) 500 MG tablet Take 1 tablet (500 mg total) by mouth every 12 (twelve) hours as needed (Pain). (Patient not taking: Reported on 6/23/2023) 20 tablet 0    naproxen (NAPROSYN) 500 MG tablet Take 1 tablet (500 mg total) by mouth every 12 (twelve) hours as needed (Pain). 20 tablet 0    pantoprazole (PROTONIX) 40 MG tablet Take 1 tablet (40 mg total) by mouth once daily. (Patient not taking: Reported on 6/20/2024) 30 tablet 11    polyethylene glycol (GLYCOLAX) 17 gram/dose powder Take 17 g by mouth once daily. (Patient not taking: Reported on 6/23/2023) 507 g 0    predniSONE (DELTASONE) 10 MG tablet Take 10 mg by mouth once daily. (Patient not taking: Reported on 4/12/2024)      progesterone (PROMETRIUM) 100 MG capsule Take 100 mg by mouth once daily. (Patient not taking: Reported on 6/20/2024)      pseudoephedrine (SUDAFED) 30 MG tablet Take 1 tablet (30 mg total) by mouth every 4 (four) hours as needed for Congestion. 24 tablet 0    QUEtiapine (SEROQUEL) 50 MG tablet Take 50 mg by mouth every evening. (Patient not taking: Reported on 4/12/2024)      rosuvastatin  (CRESTOR) 40 MG Tab TAKE 1 TABLET BY MOUTH 1 TIME IN THE EVENING      sorbitoL 70 % solution Take 15 mLs by mouth every 72 hours as needed (Breakthrough constipation not responsive to other medications). (Patient not taking: Reported on 6/23/2023) 473 mL 0    topiramate (TOPAMAX) 50 MG tablet topiramate 50 mg tablet   Take 1 tablet twice a day by oral route as directed for 30 days. (Patient not taking: Reported on 4/12/2024)             Past History     Past Medical History:   Past Medical History:   Diagnosis Date    Allergic rhinitis     Anxiety     Chronic constipation     sees Dr. Martinez, been scoped x1    Colitis     Depression     GERD (gastroesophageal reflux disease)     History of colonoscopy     Migraine headache         Past Surgical History:   Past Surgical History:   Procedure Laterality Date    COLONOSCOPY      HYSTERECTOMY      OOPHORECTOMY      UPPER GASTROINTESTINAL ENDOSCOPY          Family History:   Family History   Problem Relation Name Age of Onset    No Known Problems Mother      Diabetes Father      Cancer Father      No Known Problems Sister      No Known Problems Daughter      No Known Problems Maternal Aunt      No Known Problems Maternal Uncle      No Known Problems Paternal Aunt      No Known Problems Paternal Uncle      No Known Problems Maternal Grandmother      No Known Problems Maternal Grandfather      No Known Problems Paternal Grandmother      No Known Problems Paternal Grandfather      No Known Problems Other      Breast cancer Neg Hx      Ovarian cancer Neg Hx      BRCA 1/2 Neg Hx          Social History:   Social History     Tobacco Use    Smoking status: Never    Smokeless tobacco: Never   Substance Use Topics    Alcohol use: Yes     Comment: Social    Drug use: No        Allergies:   Review of patient's allergies indicates:  No Known Allergies       Review of Systems   Review of Systems   See HPI for pertinent positives and negatives       Physical Exam     Vitals:    06/21/24  "0823 06/21/24 0825   BP:  114/81   Pulse:  65   Resp:  18   Temp: 98.2 °F (36.8 °C)    SpO2:  97%   Weight: 79.4 kg (175 lb)    Height: 5' 7" (1.702 m)       Physical Exam  Vitals and nursing note reviewed.   Constitutional:       General: She is not in acute distress.     Appearance: Normal appearance. She is not ill-appearing.   HENT:      Head: Normocephalic and atraumatic.      Right Ear: Hearing, ear canal and external ear normal. A middle ear effusion is present.      Left Ear: Hearing, tympanic membrane, ear canal and external ear normal.      Nose: Nose normal. No congestion or rhinorrhea.      Mouth/Throat:      Mouth: Mucous membranes are moist.   Eyes:      Conjunctiva/sclera: Conjunctivae normal.      Pupils: Pupils are equal, round, and reactive to light.   Neck:      Vascular: No JVD.   Cardiovascular:      Rate and Rhythm: Regular rhythm. Bradycardia present.      Heart sounds: Normal heart sounds. Heart sounds not distant. No murmur heard.  Pulmonary:      Effort: Pulmonary effort is normal. No respiratory distress.   Musculoskeletal:         General: No deformity. Normal range of motion.      Cervical back: Normal range of motion and neck supple. No rigidity.   Lymphadenopathy:      Cervical: No cervical adenopathy.   Skin:     General: Skin is dry.   Neurological:      General: No focal deficit present.      Mental Status: She is alert and oriented to person, place, and time. Mental status is at baseline.   Psychiatric:         Mood and Affect: Mood normal.         Behavior: Behavior normal.              Diagnostic Study Results      Labs -   Recent Results (from the past 12 hour(s))   EKG 12-lead    Collection Time: 06/21/24  8:23 AM   Result Value Ref Range    QRS Duration 88 ms    [facility] QTC Calculation 368 ms        Radiologic Studies -    No orders to display        Medications given in the ED-   Medications - No data to display        Medical Decision Making    I am the first provider for this " patient.     I reviewed the vital signs, available nursing notes, past medical history, past surgical history, family history and social history.     Vital Signs:  Reviewed the patient's vital signs.     Pulse Oximetry Analysis and Interpretation:    97% on Room Air, normal    EKG interpretation: (Per my interpretation)   Interpreted by Quinn Calles MD    Sinus bradycardia rate 59 normal intervals normal axis normal EKG     External Test Results (Pertinent to encounter):    Records Reviewed: Nursing Notes, Old Medical Records, and External Medical Records     History Obtained By: Patient    Provider Notes: Noah Lantigua is a 49 y.o. female with bilateral ear pain, chest pain    Co-morbidities Considered:  Anxiety, followed by Cardiology for ?cartoid stenosis    Differential Diagnosis:  Serous otitis media, sinusitis, seasonal allergies, MSK chest pain, ACS      ED Course:    Patient well-appearing female complains of ear pain.  No findings suspicious for bacterial otitis media.  Heart rate complete a course of antibiotics.  Does report symptoms worsening with warm weather and seizing changing.  Will treat symptomatically for serous otitis media.  EKG unremarkable.  High sensitive troponin negative.  History evaluation not suspicious for cardiogenic cause of patient's chest pain. HEART score 1.         Problems Addressed:  Ear pain    Procedures:   Procedures       Diagnosis and Disposition     Critical Care:      DISCHARGE NOTE:       Noah Lantigua's  results have been reviewed with her.  She has been counseled regarding her diagnosis, treatment, and plan.  She verbally conveys understanding and agreement of the signs, symptoms, diagnosis, treatment and prognosis and additionally agrees to follow up as discussed.  She also agrees with the care-plan and conveys that all of her questions have been answered.  I have also provided discharge instructions for her that include: educational information  regarding their diagnosis and treatment, and list of reasons why they would want to return to the ED prior to their follow-up appointment, should her condition change. She has been provided with education for proper emergency department utilization.         CLINICAL IMPRESSION:         1. Otalgia of both ears    2. Chest pain              PLAN:   1. Discharge Home  2.      Medication List        START taking these medications      fluticasone propionate 50 mcg/actuation nasal spray  Commonly known as: FLONASE  2 sprays (100 mcg total) by Each Nostril route 2 (two) times a day.     pseudoephedrine 30 MG tablet  Commonly known as: SUDAFED  Take 1 tablet (30 mg total) by mouth every 4 (four) hours as needed for Congestion.            CHANGE how you take these medications      * naproxen 500 MG tablet  Commonly known as: NAPROSYN  Take 1 tablet (500 mg total) by mouth every 12 (twelve) hours as needed (Pain).  What changed: Another medication with the same name was added. Make sure you understand how and when to take each.     * naproxen 500 MG tablet  Commonly known as: NAPROSYN  Take 1 tablet (500 mg total) by mouth every 12 (twelve) hours as needed (Pain).  What changed: You were already taking a medication with the same name, and this prescription was added. Make sure you understand how and when to take each.           * This list has 2 medication(s) that are the same as other medications prescribed for you. Read the directions carefully, and ask your doctor or other care provider to review them with you.                ASK your doctor about these medications      aspirin 81 MG EC tablet  Commonly known as: ECOTRIN     atorvastatin 20 MG tablet  Commonly known as: LIPITOR     brompheniramine-pseudoeph-DM 2-30-10 mg/5 mL Syrp  Commonly known as: BROMFED DM     buPROPion 150 MG TB24 tablet  Commonly known as: WELLBUTRIN XL     butalbital-acetaminophen-caffeine -40 mg -40 mg per tablet  Commonly known as:  FIORICET, ESGIC  Take 1 tablet by mouth every 6 (six) hours as needed for Pain or Headaches.     clonazePAM 0.5 MG tablet  Commonly known as: KlonoPIN     cyclobenzaprine 10 MG tablet  Commonly known as: FLEXERIL     cyproheptadine 4 mg tablet  Commonly known as: PERIACTIN     diclofenac 75 MG EC tablet  Commonly known as: VOLTAREN  Take 1 tablet (75 mg total) by mouth 2 (two) times daily as needed.     dicyclomine 10 MG capsule  Commonly known as: BENTYL  TAKE ONE CAPSULE BY MOUTH FOUR TIMES DAILY BEFORE MEALS AND NIGHTLY     docusate sodium 100 MG capsule  Commonly known as: COLACE     EFFEXOR XR 37.5 mg 24 hr capsule  Generic drug: venlafaxine     estradioL 2 MG tablet  Commonly known as: ESTRACE     ezetimibe 10 mg tablet  Commonly known as: ZETIA     famotidine 40 MG tablet  Commonly known as: PEPCID     fluconazole 150 MG Tab  Commonly known as: DIFLUCAN     fluocinonide 0.05 % external solution  Commonly known as: LIDEX     gabapentin 300 MG capsule  Commonly known as: NEURONTIN     hydrOXYzine HCL 25 MG tablet  Commonly known as: ATARAX     ibuprofen 800 MG tablet  Commonly known as: ADVIL,MOTRIN  Take 1 tablet (800 mg total) by mouth every 6 (six) hours as needed for Pain.     LIDOcaine 5 %  Commonly known as: LIDODERM  Place 1 patch onto the skin once daily. Remove & Discard patch within 12 hours or as directed by MD     LORazepam 0.5 MG tablet  Commonly known as: ATIVAN     montelukast 10 mg tablet  Commonly known as: SINGULAIR     pantoprazole 40 MG tablet  Commonly known as: PROTONIX  Take 1 tablet (40 mg total) by mouth once daily.     polyethylene glycol 17 gram/dose powder  Commonly known as: GLYCOLAX  Take 17 g by mouth once daily.     predniSONE 10 MG tablet  Commonly known as: DELTASONE     progesterone 100 MG capsule  Commonly known as: PROMETRIUM     QUEtiapine 50 MG tablet  Commonly known as: SEROQUEL     REPATHA SURECLICK 140 mg/mL Pnij  Generic drug: evolocumab     rosuvastatin 40 MG  Tab  Commonly known as: CRESTOR     sorbitoL 70 % solution  Take 15 mLs by mouth every 72 hours as needed (Breakthrough constipation not responsive to other medications).     topiramate 50 MG tablet  Commonly known as: TOPAMAX               Where to Get Your Medications        These medications were sent to [id number] - [place], [place] - [address] AT [facility] [address]  [address] [facility] [address]      Phone: [phone]   fluticasone propionate 50 mcg/actuation nasal spray  naproxen 500 MG tablet  pseudoephedrine 30 MG tablet        [address] [address] [phone]    Schedule an appointment as soon as possible for a visit in 1 week      [facility] Emergency Department  [address] [facility] [address] [phone]  Go to   If symptoms worsen       _______________________________     Please note that this dictation was completed with [employer], the computer voice recognition software.  Quite often unanticipated grammatical, syntax, homophones, and other interpretive errors are inadvertently transcribed by the computer software.  Please disregard these errors.  Please excuse any errors that have escaped final proofreading.             Quinn Calles MD  06/21/24 0959

## 2024-10-21 ENCOUNTER — OFFICE VISIT (OUTPATIENT)
Dept: OBSTETRICS AND GYNECOLOGY | Facility: CLINIC | Age: 49
End: 2024-10-21
Payer: MEDICAID

## 2024-10-21 VITALS
BODY MASS INDEX: 28.09 KG/M2 | SYSTOLIC BLOOD PRESSURE: 132 MMHG | HEART RATE: 67 BPM | HEIGHT: 67 IN | WEIGHT: 179 LBS | DIASTOLIC BLOOD PRESSURE: 76 MMHG

## 2024-10-21 DIAGNOSIS — R61 NIGHT SWEAT: ICD-10-CM

## 2024-10-21 DIAGNOSIS — Z90.710 HISTORY OF HYSTERECTOMY: ICD-10-CM

## 2024-10-21 DIAGNOSIS — Z01.419 ENCOUNTER FOR ANNUAL ROUTINE GYNECOLOGICAL EXAMINATION: Primary | ICD-10-CM

## 2024-10-21 DIAGNOSIS — R23.2 HOT FLASHES: ICD-10-CM

## 2024-10-21 DIAGNOSIS — N95.1 MENOPAUSAL SYMPTOMS: ICD-10-CM

## 2024-10-21 PROCEDURE — 3078F DIAST BP <80 MM HG: CPT | Mod: CPTII,,, | Performed by: OBSTETRICS & GYNECOLOGY

## 2024-10-21 PROCEDURE — 99999 PR PBB SHADOW E&M-EST. PATIENT-LVL IV: CPT | Mod: PBBFAC,,, | Performed by: OBSTETRICS & GYNECOLOGY

## 2024-10-21 PROCEDURE — 99386 PREV VISIT NEW AGE 40-64: CPT | Mod: S$PBB,,, | Performed by: OBSTETRICS & GYNECOLOGY

## 2024-10-21 PROCEDURE — 1159F MED LIST DOCD IN RCRD: CPT | Mod: CPTII,,, | Performed by: OBSTETRICS & GYNECOLOGY

## 2024-10-21 PROCEDURE — 3075F SYST BP GE 130 - 139MM HG: CPT | Mod: CPTII,,, | Performed by: OBSTETRICS & GYNECOLOGY

## 2024-10-21 PROCEDURE — 3008F BODY MASS INDEX DOCD: CPT | Mod: CPTII,,, | Performed by: OBSTETRICS & GYNECOLOGY

## 2024-10-21 PROCEDURE — 99214 OFFICE O/P EST MOD 30 MIN: CPT | Mod: PBBFAC | Performed by: OBSTETRICS & GYNECOLOGY

## 2024-10-21 PROCEDURE — 1160F RVW MEDS BY RX/DR IN RCRD: CPT | Mod: CPTII,,, | Performed by: OBSTETRICS & GYNECOLOGY

## 2024-10-21 RX ORDER — ESTRADIOL 0.05 MG/D
1 FILM, EXTENDED RELEASE TRANSDERMAL
Qty: 8 PATCH | Refills: 11 | Status: SHIPPED | OUTPATIENT
Start: 2024-10-21 | End: 2025-10-21

## 2024-10-21 NOTE — PROGRESS NOTES
SUBJECTIVE:   49 y.o. female for annual routine Pap and checkup.  Has been having hot flashes and night sweats since her hysterectomy. Hysterectomy was years ago and ovaries were removed. She has tried several things in the past but they did not work well  Current Outpatient Medications   Medication Sig Dispense Refill    REPATHA SURECLICK 140 mg/mL PnIj       aspirin (ECOTRIN) 81 MG EC tablet Take 81 mg by mouth once daily. (Patient not taking: Reported on 10/21/2024)      atorvastatin (LIPITOR) 20 MG tablet Take 20 mg by mouth once daily. (Patient not taking: Reported on 4/12/2024)      brompheniramine-pseudoeph-DM (BROMFED DM) 2-30-10 mg/5 mL Syrp TAKE 10 ML BY MOUTH EVERY 6 HOURS AS NEEDED FOR COUGH AND CONGESTION (Patient not taking: Reported on 4/12/2024)      buPROPion (WELLBUTRIN XL) 150 MG TB24 tablet Take 150 mg by mouth every morning. (Patient not taking: Reported on 4/12/2024)      butalbital-acetaminophen-caffeine -40 mg (FIORICET, ESGIC) -40 mg per tablet Take 1 tablet by mouth every 6 (six) hours as needed for Pain or Headaches. (Patient not taking: Reported on 3/6/2024) 15 tablet 0    clonazePAM (KLONOPIN) 0.5 MG tablet TK 1 T PO ONCE D PRA (Patient not taking: Reported on 4/12/2024)      cyclobenzaprine (FLEXERIL) 10 MG tablet 1 tablet at bedtime as needed Orally Once a day (Patient not taking: Reported on 4/12/2024)      cyproheptadine (PERIACTIN) 4 mg tablet Take 4 mg by mouth 3 (three) times daily as needed. (Patient not taking: Reported on 4/12/2024)      diclofenac (VOLTAREN) 75 MG EC tablet Take 1 tablet (75 mg total) by mouth 2 (two) times daily as needed. (Patient not taking: Reported on 6/23/2023) 10 tablet 0    dicyclomine (BENTYL) 10 MG capsule TAKE ONE CAPSULE BY MOUTH FOUR TIMES DAILY BEFORE MEALS AND NIGHTLY (Patient not taking: Reported on 6/23/2023) 120 capsule 0    docusate sodium (COLACE) 100 MG capsule Doc-Q-Lace 100 mg capsule   TK 1 C PO BID (Patient not taking:  Reported on 4/12/2024)      EFFEXOR XR 37.5 mg 24 hr capsule Take 37.5 mg by mouth once daily. (Patient not taking: Reported on 4/12/2024)      estradiol (ESTRACE) 2 MG tablet Take 2 mg by mouth once daily. (Patient not taking: Reported on 4/12/2024)      ezetimibe (ZETIA) 10 mg tablet  (Patient not taking: Reported on 10/21/2024)      famotidine (PEPCID) 40 MG tablet Take 40 mg by mouth once daily. (Patient not taking: Reported on 10/21/2024)      famotidine (PEPCID) 40 MG tablet Take 40 mg by mouth once daily. (Patient not taking: Reported on 10/21/2024)      fluconazole (DIFLUCAN) 150 MG Tab fluconazole 150 mg tablet (Patient not taking: Reported on 4/12/2024)      fluocinonide (LIDEX) 0.05 % external solution fluocinonide 0.05 % topical solution (Patient not taking: Reported on 4/12/2024)      fluticasone propionate (FLONASE) 50 mcg/actuation nasal spray 2 sprays (100 mcg total) by Each Nostril route 2 (two) times a day. 15.8 mL 0    gabapentin (NEURONTIN) 300 MG capsule gabapentin 300 mg capsule, [RxNorm: 442530] (Patient not taking: Reported on 4/12/2024)      hydrOXYzine HCL (ATARAX) 25 MG tablet Take 25 mg by mouth 3 (three) times daily. (Patient not taking: Reported on 4/12/2024)      ibuprofen (ADVIL,MOTRIN) 800 MG tablet Take 1 tablet (800 mg total) by mouth every 6 (six) hours as needed for Pain. (Patient not taking: Reported on 3/6/2024) 20 tablet 0    LIDOcaine (LIDODERM) 5 % Place 1 patch onto the skin once daily. Remove & Discard patch within 12 hours or as directed by MD (Patient not taking: Reported on 3/6/2024) 20 patch 0    lorazepam (ATIVAN) 0.5 MG tablet Take 0.5 mg by mouth every 6 (six) hours as needed for Anxiety. (Patient not taking: Reported on 4/12/2024)      montelukast (SINGULAIR) 10 mg tablet Take 10 mg by mouth every evening. (Patient not taking: Reported on 4/12/2024)      naproxen (NAPROSYN) 500 MG tablet Take 1 tablet (500 mg total) by mouth every 12 (twelve) hours as needed  (Pain). (Patient not taking: Reported on 6/23/2023) 20 tablet 0    naproxen (NAPROSYN) 500 MG tablet Take 1 tablet (500 mg total) by mouth every 12 (twelve) hours as needed (Pain). 20 tablet 0    ondansetron (ZOFRAN-ODT) 8 MG TbDL Take 1 tablet (8 mg total) by mouth every 12 (twelve) hours as needed. (Patient not taking: Reported on 10/21/2024) 60 tablet 11    pantoprazole (PROTONIX) 40 MG tablet Take 1 tablet (40 mg total) by mouth once daily. (Patient not taking: Reported on 6/20/2024) 30 tablet 11    polyethylene glycol (GLYCOLAX) 17 gram/dose powder Take 17 g by mouth once daily. (Patient not taking: Reported on 6/23/2023) 507 g 0    predniSONE (DELTASONE) 10 MG tablet Take 10 mg by mouth once daily. (Patient not taking: Reported on 4/12/2024)      progesterone (PROMETRIUM) 100 MG capsule Take 100 mg by mouth once daily. (Patient not taking: Reported on 6/20/2024)      prucalopride (MOTEGRITY) 2 mg Tab Take 2 mg by mouth once daily. (Patient not taking: Reported on 10/21/2024) 30 tablet 11    QUEtiapine (SEROQUEL) 50 MG tablet Take 50 mg by mouth every evening. (Patient not taking: Reported on 4/12/2024)      rosuvastatin (CRESTOR) 40 MG Tab TAKE 1 TABLET BY MOUTH 1 TIME IN THE EVENING (Patient not taking: Reported on 10/21/2024)      sorbitoL 70 % solution Take 15 mLs by mouth every 72 hours as needed (Breakthrough constipation not responsive to other medications). (Patient not taking: Reported on 6/23/2023) 473 mL 0    topiramate (TOPAMAX) 50 MG tablet topiramate 50 mg tablet   Take 1 tablet twice a day by oral route as directed for 30 days. (Patient not taking: Reported on 4/12/2024)       No current facility-administered medications for this visit.     Allergies: Patient has no known allergies.   No LMP recorded. Patient has had a hysterectomy.    ROS:  Feeling well. No dyspnea or chest pain on exertion.  No abdominal pain, change in bowel habits, black or bloody stools.  No urinary tract symptoms. GYN ROS:  "no breast pain or new or enlarging lumps on self exam, no vaginal bleeding, no discharge or pelvic pain, she complains of hot flashes. No neurological complaints.    OBJECTIVE:   The patient appears well, alert, oriented x 3, in no distress.  /76   Pulse 67   Ht 5' 7" (1.702 m)   Wt 81.2 kg (179 lb)   BMI 28.04 kg/m²   ENT normal.  Neck supple. No adenopathy or thyromegaly. KENDALL. Lungs are clear, good air entry, no wheezes, rhonchi or rales. S1 and S2 normal, no murmurs, regular rate and rhythm. Abdomen soft without tenderness, guarding, mass or organomegaly. Extremities show no edema, normal peripheral pulses. Neurological is normal, no focal findings.    BREAST EXAM: breasts appear normal, no suspicious masses, no skin or nipple changes or axillary nodes    PELVIC EXAM: VULVA: normal appearing vulva with no masses, tenderness or lesions, VAGINA: normal appearing vagina with normal color and discharge, no lesions, CERVIX: surgically absent, UTERUS: surgically absent, vaginal cuff well healed, ADNEXA: normal adnexa in size, nontender and no masses, PAP: Pap smear done today, thin-prep method    ASSESSMENT:   well woman  no contraindication to begin hormonal therapy    PLAN:   pap smear  counseled on breast self exam and use and side effects of HRT  return annually or prn  Rx vivelle dot 0.05    "

## 2025-01-09 DIAGNOSIS — Z12.31 ENCOUNTER FOR SCREENING MAMMOGRAM FOR MALIGNANT NEOPLASM OF BREAST: Primary | ICD-10-CM

## 2025-02-07 ENCOUNTER — HOSPITAL ENCOUNTER (OUTPATIENT)
Dept: RADIOLOGY | Facility: HOSPITAL | Age: 50
Discharge: HOME | End: 2025-02-07
Attending: NURSE PRACTITIONER
Payer: MEDICAID

## 2025-02-07 VITALS — WEIGHT: 178 LBS | BODY MASS INDEX: 27.94 KG/M2 | HEIGHT: 67 IN

## 2025-02-07 DIAGNOSIS — Z12.31 ENCOUNTER FOR SCREENING MAMMOGRAM FOR MALIGNANT NEOPLASM OF BREAST: ICD-10-CM

## 2025-02-07 PROCEDURE — 77067 SCR MAMMO BI INCL CAD: CPT | Mod: TC

## 2025-06-27 ENCOUNTER — HOSPITAL ENCOUNTER (EMERGENCY)
Facility: HOSPITAL | Age: 50
Discharge: HOME | End: 2025-06-27
Attending: EMERGENCY MEDICINE
Payer: MEDICAID

## 2025-06-27 ENCOUNTER — LAB VISIT (OUTPATIENT)
Dept: LAB | Facility: HOSPITAL | Age: 50
End: 2025-06-27
Attending: OBSTETRICS & GYNECOLOGY
Payer: MEDICAID

## 2025-06-27 ENCOUNTER — CLINICAL SUPPORT (OUTPATIENT)
Dept: OBSTETRICS AND GYNECOLOGY | Facility: CLINIC | Age: 50
End: 2025-06-27
Payer: MEDICAID

## 2025-06-27 VITALS
RESPIRATION RATE: 18 BRPM | WEIGHT: 185.19 LBS | SYSTOLIC BLOOD PRESSURE: 132 MMHG | OXYGEN SATURATION: 99 % | BODY MASS INDEX: 29.07 KG/M2 | HEIGHT: 67 IN | HEART RATE: 71 BPM | DIASTOLIC BLOOD PRESSURE: 84 MMHG | TEMPERATURE: 98 F

## 2025-06-27 DIAGNOSIS — G89.29 OTHER CHRONIC PAIN: Primary | ICD-10-CM

## 2025-06-27 DIAGNOSIS — Z20.2 ENCOUNTER FOR ASSESSMENT OF STD EXPOSURE: ICD-10-CM

## 2025-06-27 DIAGNOSIS — R10.13 EPIGASTRIC PAIN: ICD-10-CM

## 2025-06-27 DIAGNOSIS — Z20.2 ENCOUNTER FOR ASSESSMENT OF STD EXPOSURE: Primary | ICD-10-CM

## 2025-06-27 LAB
ABSOLUTE EOSINOPHIL (OHS): 0.04 K/UL
ABSOLUTE MONOCYTE (OHS): 0.29 K/UL (ref 0.3–1)
ABSOLUTE NEUTROPHIL COUNT (OHS): 1.42 K/UL (ref 1.8–7.7)
ALBUMIN SERPL BCP-MCNC: 4.1 G/DL (ref 3.5–5.2)
ALP SERPL-CCNC: 105 UNIT/L (ref 40–150)
ALT SERPL W/O P-5'-P-CCNC: 12 UNIT/L (ref 10–44)
ANION GAP (OHS): 8 MMOL/L (ref 8–16)
AST SERPL-CCNC: 21 UNIT/L (ref 11–45)
BACTERIA #/AREA URNS AUTO: ABNORMAL /HPF
BASOPHILS # BLD AUTO: 0.03 K/UL
BASOPHILS NFR BLD AUTO: 0.7 %
BILIRUB SERPL-MCNC: 0.9 MG/DL (ref 0.1–1)
BILIRUB UR QL STRIP.AUTO: NEGATIVE
BUN SERPL-MCNC: 10 MG/DL (ref 6–20)
C TRACH DNA SPEC QL NAA+PROBE: NOT DETECTED
CALCIUM SERPL-MCNC: 9.3 MG/DL (ref 8.7–10.5)
CHLORIDE SERPL-SCNC: 109 MMOL/L (ref 95–110)
CLARITY UR: CLEAR
CO2 SERPL-SCNC: 23 MMOL/L (ref 23–29)
COLOR UR AUTO: YELLOW
CREAT SERPL-MCNC: 1.1 MG/DL (ref 0.5–1.4)
CTGC SOURCE (OHS) ORD-325: NORMAL
ERYTHROCYTE [DISTWIDTH] IN BLOOD BY AUTOMATED COUNT: 11.9 % (ref 11.5–14.5)
GFR SERPLBLD CREATININE-BSD FMLA CKD-EPI: >60 ML/MIN/1.73/M2
GLUCOSE SERPL-MCNC: 109 MG/DL (ref 70–110)
GLUCOSE UR QL STRIP: NEGATIVE
HAV IGM SERPL QL IA: NORMAL
HBV CORE IGM SERPL QL IA: NORMAL
HBV SURFACE AG SERPL QL IA: NORMAL
HCT VFR BLD AUTO: 44.1 % (ref 37–48.5)
HCV AB SERPL QL IA: NORMAL
HGB BLD-MCNC: 14.9 GM/DL (ref 12–16)
HGB UR QL STRIP: NEGATIVE
HIV 1+2 AB+HIV1 P24 AG SERPL QL IA: NORMAL
HOLD SPECIMEN: NORMAL
HYALINE CASTS UR QL AUTO: 1 /LPF (ref 0–1)
IMM GRANULOCYTES # BLD AUTO: 0.01 K/UL (ref 0–0.04)
IMM GRANULOCYTES NFR BLD AUTO: 0.2 % (ref 0–0.5)
KETONES UR QL STRIP: NEGATIVE
LEUKOCYTE ESTERASE UR QL STRIP: ABNORMAL
LIPASE SERPL-CCNC: 21 U/L (ref 4–60)
LYMPHOCYTES # BLD AUTO: 2.29 K/UL (ref 1–4.8)
MCH RBC QN AUTO: 28.8 PG (ref 27–31)
MCHC RBC AUTO-ENTMCNC: 33.8 G/DL (ref 32–36)
MCV RBC AUTO: 85 FL (ref 82–98)
MICROSCOPIC COMMENT: ABNORMAL
N GONORRHOEA DNA UR QL NAA+PROBE: NOT DETECTED
NITRITE UR QL STRIP: NEGATIVE
NUCLEATED RBC (/100WBC) (OHS): 0 /100 WBC
PH UR STRIP: 6 [PH]
PLATELET # BLD AUTO: 275 K/UL (ref 150–450)
PMV BLD AUTO: 10 FL (ref 9.2–12.9)
POTASSIUM SERPL-SCNC: 3.9 MMOL/L (ref 3.5–5.1)
PROT SERPL-MCNC: 7.7 GM/DL (ref 6–8.4)
PROT UR QL STRIP: NEGATIVE
RBC # BLD AUTO: 5.17 M/UL (ref 4–5.4)
RBC #/AREA URNS AUTO: 2 /HPF (ref 0–4)
RELATIVE EOSINOPHIL (OHS): 1 %
RELATIVE LYMPHOCYTE (OHS): 56.1 % (ref 18–48)
RELATIVE MONOCYTE (OHS): 7.1 % (ref 4–15)
RELATIVE NEUTROPHIL (OHS): 34.9 % (ref 38–73)
SODIUM SERPL-SCNC: 140 MMOL/L (ref 136–145)
SP GR UR STRIP: 1.02
SQUAMOUS #/AREA URNS AUTO: 7 /HPF
UROBILINOGEN UR STRIP-ACNC: 1 EU/DL
WBC # BLD AUTO: 4.08 K/UL (ref 3.9–12.7)
WBC #/AREA URNS AUTO: 7 /HPF (ref 0–5)

## 2025-06-27 PROCEDURE — 81003 URINALYSIS AUTO W/O SCOPE: CPT | Performed by: NURSE PRACTITIONER

## 2025-06-27 PROCEDURE — 83690 ASSAY OF LIPASE: CPT | Performed by: NURSE PRACTITIONER

## 2025-06-27 PROCEDURE — 80053 COMPREHEN METABOLIC PANEL: CPT | Performed by: NURSE PRACTITIONER

## 2025-06-27 PROCEDURE — 87389 HIV-1 AG W/HIV-1&-2 AB AG IA: CPT

## 2025-06-27 PROCEDURE — 25000003 PHARM REV CODE 250: Performed by: NURSE PRACTITIONER

## 2025-06-27 PROCEDURE — 99283 EMERGENCY DEPT VISIT LOW MDM: CPT

## 2025-06-27 PROCEDURE — 80074 ACUTE HEPATITIS PANEL: CPT

## 2025-06-27 PROCEDURE — 36415 COLL VENOUS BLD VENIPUNCTURE: CPT

## 2025-06-27 PROCEDURE — 36415 COLL VENOUS BLD VENIPUNCTURE: CPT | Performed by: NURSE PRACTITIONER

## 2025-06-27 PROCEDURE — 85025 COMPLETE CBC W/AUTO DIFF WBC: CPT | Performed by: NURSE PRACTITIONER

## 2025-06-27 PROCEDURE — 87591 N.GONORRHOEAE DNA AMP PROB: CPT

## 2025-06-27 RX ORDER — ALUMINUM HYDROXIDE, MAGNESIUM HYDROXIDE, AND SIMETHICONE 1200; 120; 1200 MG/30ML; MG/30ML; MG/30ML
30 SUSPENSION ORAL ONCE
Status: COMPLETED | OUTPATIENT
Start: 2025-06-27 | End: 2025-06-27

## 2025-06-27 RX ORDER — LIDOCAINE HYDROCHLORIDE 20 MG/ML
15 SOLUTION OROPHARYNGEAL ONCE
Status: COMPLETED | OUTPATIENT
Start: 2025-06-27 | End: 2025-06-27

## 2025-06-27 RX ADMIN — LIDOCAINE HYDROCHLORIDE 15 ML: 20 SOLUTION ORAL at 10:06

## 2025-06-27 RX ADMIN — ALUMINUM HYDROXIDE, MAGNESIUM HYDROXIDE, AND DIMETHICONE 30 ML: 200; 20; 200 SUSPENSION ORAL at 10:06

## 2025-06-27 NOTE — ED PROVIDER NOTES
"Encounter Date: 6/27/2025       History     Chief Complaint   Patient presents with    Abdominal Pain     Pt stated that she has been experiencing intermittent upper abdominal pain worsening today. Hx gastroparesis. Last BM few days ago - constipated.      This is a 51 y/o black female with a hx of chronic constipation, gastroparesis, and GERD who presents to the ED with c/o upper mid abdominal pain "for years", characterized by midline cramping and feeling of "food just sitting there" after eating. Symptoms associated with constipation for 2 days and feeling "bloated". She reports symptoms are worse than usual which is why she decided to seek care here in the ED today. She does admit to some occasional urinary frequency as well. She denies fever, appetite changes, nausea/vomiting, lower abdominal pain, or black/bloody stools. Able to pass gas.       Review of patient's allergies indicates:  No Known Allergies  Past Medical History:   Diagnosis Date    Allergic rhinitis     Anxiety     Chronic constipation     sees Dr. Martinez, been scoped x1    Colitis     Depression     Gastroparesis     GERD (gastroesophageal reflux disease)     History of colonoscopy     Migraine headache      Past Surgical History:   Procedure Laterality Date    COLONOSCOPY      HYSTERECTOMY      OOPHORECTOMY      UPPER GASTROINTESTINAL ENDOSCOPY       Family History   Problem Relation Name Age of Onset    No Known Problems Mother      Diabetes Father      Cancer Father      No Known Problems Sister      No Known Problems Daughter      No Known Problems Maternal Aunt      No Known Problems Maternal Uncle      No Known Problems Paternal Aunt      No Known Problems Paternal Uncle      No Known Problems Maternal Grandmother      No Known Problems Maternal Grandfather      No Known Problems Paternal Grandmother      No Known Problems Paternal Grandfather      No Known Problems Other      Breast cancer Neg Hx      Ovarian cancer Neg Hx      BRCA 1/2 Neg " Hx       Social History[1]  Review of Systems   Constitutional:  Negative for appetite change and fever.   Gastrointestinal:  Positive for abdominal pain and constipation. Negative for nausea and vomiting.       Physical Exam     Initial Vitals [06/27/25 0902]   BP Pulse Resp Temp SpO2   (!) 138/99 62 18 97.9 °F (36.6 °C) 96 %      MAP       --         Physical Exam    Nursing note and vitals reviewed.  Constitutional: She appears well-developed and well-nourished. She is active. No distress.   HENT:   Head: Normocephalic and atraumatic.   Eyes: EOM are normal. Pupils are equal, round, and reactive to light.   Neck: Neck supple.   Normal range of motion.  Cardiovascular:  Normal rate, regular rhythm and normal heart sounds.           Pulmonary/Chest: Breath sounds normal. No respiratory distress.   Abdominal: Abdomen is soft. Bowel sounds are normal. She exhibits no distension. There is no abdominal tenderness.   Musculoskeletal:         General: No edema. Normal range of motion.      Cervical back: Normal range of motion and neck supple.     Neurological: She is alert and oriented to person, place, and time. GCS score is 15. GCS eye subscore is 4. GCS verbal subscore is 5. GCS motor subscore is 6.   Skin: Skin is warm and dry. Capillary refill takes less than 2 seconds.   Psychiatric: She has a normal mood and affect. Her behavior is normal. Thought content normal.         ED Course   Procedures  Labs Reviewed   URINALYSIS, REFLEX TO URINE CULTURE - Abnormal       Result Value    Color, UA Yellow      Appearance, UA Clear      pH, UA 6.0      Spec Grav UA 1.020      Protein, UA Negative      Glucose, UA Negative      Ketones, UA Negative      Bilirubin, UA Negative      Blood, UA Negative      Nitrites, UA Negative      Urobilinogen, UA 1.0      Leukocyte Esterase, UA 1+ (*)    CBC WITH DIFFERENTIAL - Abnormal    WBC 4.08      RBC 5.17      HGB 14.9      HCT 44.1      MCV 85      MCH 28.8      MCHC 33.8      RDW  11.9      Platelet Count 275      MPV 10.0      Nucleated RBC 0      Neut % 34.9 (*)     Lymph % 56.1 (*)     Mono % 7.1      Eos % 1.0      Basophil % 0.7      Imm Grans % 0.2      Neut # 1.42 (*)     Lymph # 2.29      Mono # 0.29 (*)     Eos # 0.04      Baso # 0.03      Imm Grans # 0.01     URINALYSIS MICROSCOPIC - Abnormal    RBC, UA 2      WBC, UA 7 (*)     Bacteria, UA None      Squamous Epithelial Cells, UA 7      Hyaline Casts, UA 1      Microscopic Comment       COMPREHENSIVE METABOLIC PANEL - Normal    Sodium 140      Potassium 3.9      Chloride 109      CO2 23      Glucose 109      BUN 10      Creatinine 1.1      Calcium 9.3      Protein Total 7.7      Albumin 4.1      Bilirubin Total 0.9            AST 21      ALT 12      Anion Gap 8      eGFR >60     LIPASE - Normal    Lipase Level 21     CBC W/ AUTO DIFFERENTIAL    Narrative:     The following orders were created for panel order CBC auto differential.  Procedure                               Abnormality         Status                     ---------                               -----------         ------                     CBC with Differential[[id number]]       Abnormal            Final result                 Please view results for these tests on the individual orders.   GREY TOP URINE HOLD          Imaging Results    None          Medications   aluminum-magnesium hydroxide-simethicone 200-200-20 mg/5 mL suspension 30 mL (30 mLs Oral Given 6/27/25 1001)     And   LIDOcaine viscous HCl 2% oral solution 15 mL (15 mLs Oral Given 6/27/25 1001)     Medical Decision Making  Amount and/or Complexity of Data Reviewed  Labs: ordered.    Risk  OTC drugs.  Prescription drug management.               ED Course as of 06/27/25 1022   Fri Jun 27, 2025   1019 All labs relatively unremarkable.  Patient did have bowel movement here in the ED and reports improvement in symptoms bowel movement and GI cocktail.  Given chronic abdominal history, esophageal  strictures in the past, will refer to General surgery for further evaluation of chronic symptoms. [CB]      ED Course User Index  [CB] Meagan Mendoza NP                           Clinical Impression:  Final diagnoses:  [G89.29] Other chronic pain (Primary)  [R10.13] Epigastric pain          ED Disposition Condition    Discharge Stable          ED Prescriptions       Medication Sig Dispense Start Date End Date Auth. Provider    simethicone 125 mg Tab Take 1 tablet by mouth every 4 to 6 hours as needed (Gas). 40 tablet 6/27/2025 7/7/2025 Meagan Mendoza NP          Follow-up Information       Follow up With Specialties Details Why Contact Info    PCP Follow UP  Schedule an appointment as soon as possible for a visit in 2 days for follow-up, for re-evaluation of today's complaint                    [1]   Social History  Tobacco Use    Smoking status: Never     Passive exposure: Never    Smokeless tobacco: Never   Substance Use Topics    Alcohol use: Yes     Comment: Social    Drug use: No        Meagan Mendoza NP  06/27/25 1024

## 2025-06-27 NOTE — DISCHARGE INSTRUCTIONS
Use medication as directed.  You should receive a phone call to schedule an appointment with General surgery within the next week.  See your primary doctor as well and return to the emergency department for worsening condition.

## 2025-06-27 NOTE — ED NOTES
NEUROLOGICAL:   Patient is awake , alert , and oriented x 4 . Pupils are PERRL. Gait is steady.   Moves all extremities without difficulty.   Patient reports no neuro complaints..  GCS 15    HEENT:   Head appears normocephalic  and symmetric .   Eyes appear WNL to both eyes. Patient reports no complaints  to both eyes .   Ears appear WNL. Patient reports no complaints  to both ears.   Nares appear patent . Patient reports no nose complaints .  Mouth appears moist, pink, and teeth intact. Patient reports no mouth complaints.   Throat appears pink and moist . Patient reports no throat complaints.    CARDIOVASCULAR:     On palpation no edema noted , noted to none.   Patient reports no CV complaints.  .   Patient vitals are WNL.    RESPIRATORY:   Airway Clear, Open, and Patent.  Respirations are even and unlabored.   Breath sounds clear  to all lung fields.   Patient reports no respiratory complaints.     GASTROINTESTINAL:   Abdomen is soft , tender to epigastric area, and tender to umbilical area. Bowel sounds are normoactive to all quadrants .   Patient reports heartburn/indigestion. Pt reports she has Gastroparesis and has been dealing with the pain for years.    GENITOURINARY:   Patient reports no  complaints.     MUSCULOSKELETAL:   full range of motion to all extremities, no swelling noted , no tenderness noted, and no weakness noted.   Patient reports no musculoskeletal complaints     SKIN:   Skin appears warm , dry , good turgor, color normal for race, and intact. Patient reports no skin complaints.

## 2025-06-27 NOTE — PROGRESS NOTES
Patient present today for STD testing the patient provided a urine sample and will go to the lab to provide a blood sample. She will be called with results once they have been received by the clinic.

## 2025-06-30 ENCOUNTER — RESULTS FOLLOW-UP (OUTPATIENT)
Dept: OBSTETRICS AND GYNECOLOGY | Facility: CLINIC | Age: 50
End: 2025-06-30

## 2025-07-01 ENCOUNTER — TELEPHONE (OUTPATIENT)
Dept: OBSTETRICS AND GYNECOLOGY | Facility: CLINIC | Age: 50
End: 2025-07-01
Payer: MEDICAID

## 2025-07-01 NOTE — TELEPHONE ENCOUNTER
Called pt back and notified of her HSV results. Pt verbalized understanding and instructed to call with any further questions or concerns.

## 2025-07-07 ENCOUNTER — TELEPHONE (OUTPATIENT)
Dept: SURGERY | Facility: CLINIC | Age: 50
End: 2025-07-07

## 2025-07-07 ENCOUNTER — OFFICE VISIT (OUTPATIENT)
Dept: SURGERY | Facility: CLINIC | Age: 50
End: 2025-07-07
Payer: MEDICAID

## 2025-07-07 VITALS
HEART RATE: 77 BPM | DIASTOLIC BLOOD PRESSURE: 88 MMHG | RESPIRATION RATE: 18 BRPM | HEIGHT: 67 IN | OXYGEN SATURATION: 98 % | WEIGHT: 188.81 LBS | BODY MASS INDEX: 29.63 KG/M2 | SYSTOLIC BLOOD PRESSURE: 126 MMHG

## 2025-07-07 DIAGNOSIS — R10.84 GENERALIZED ABDOMINAL PAIN: Primary | ICD-10-CM

## 2025-07-07 DIAGNOSIS — Z01.818 PREOPERATIVE CLEARANCE: ICD-10-CM

## 2025-07-07 DIAGNOSIS — R13.10 DYSPHAGIA, UNSPECIFIED TYPE: ICD-10-CM

## 2025-07-07 DIAGNOSIS — K59.09 CHRONIC CONSTIPATION: ICD-10-CM

## 2025-07-07 DIAGNOSIS — K21.9 GASTROESOPHAGEAL REFLUX DISEASE, UNSPECIFIED WHETHER ESOPHAGITIS PRESENT: ICD-10-CM

## 2025-07-07 DIAGNOSIS — K64.9 HEMORRHOIDS, UNSPECIFIED HEMORRHOID TYPE: ICD-10-CM

## 2025-07-07 PROCEDURE — 99215 OFFICE O/P EST HI 40 MIN: CPT | Mod: PBBFAC

## 2025-07-07 PROCEDURE — 99999 PR PBB SHADOW E&M-EST. PATIENT-LVL V: CPT | Mod: PBBFAC,,,

## 2025-07-07 RX ORDER — PANTOPRAZOLE SODIUM 40 MG/1
40 TABLET, DELAYED RELEASE ORAL DAILY
Qty: 30 TABLET | Refills: 11 | Status: SHIPPED | OUTPATIENT
Start: 2025-07-07 | End: 2026-07-07

## 2025-07-07 RX ORDER — LATANOPROST 50 UG/ML
1 SOLUTION/ DROPS OPHTHALMIC DAILY
COMMUNITY
Start: 2025-06-30

## 2025-07-07 RX ORDER — ROSUVASTATIN CALCIUM 40 MG/1
40 TABLET, COATED ORAL NIGHTLY
COMMUNITY

## 2025-07-07 RX ORDER — SODIUM CHLORIDE 9 MG/ML
INJECTION, SOLUTION INTRAVENOUS CONTINUOUS
OUTPATIENT
Start: 2025-07-07

## 2025-07-07 RX ORDER — NETARSUDIL 0.2 MG/ML
1 SOLUTION/ DROPS OPHTHALMIC; TOPICAL NIGHTLY
COMMUNITY
Start: 2025-06-30

## 2025-07-07 RX ORDER — SODIUM CHLORIDE 0.9 % (FLUSH) 0.9 %
10 SYRINGE (ML) INJECTION
OUTPATIENT
Start: 2025-07-07

## 2025-07-07 RX ORDER — POLYETHYLENE GLYCOL 3350 17 G/17G
17 POWDER, FOR SOLUTION ORAL DAILY
COMMUNITY

## 2025-07-07 RX ORDER — HYDROCORTISONE 25 MG/G
CREAM TOPICAL 2 TIMES DAILY
Qty: 28 G | Refills: 0 | Status: SHIPPED | OUTPATIENT
Start: 2025-07-07 | End: 2025-07-21

## 2025-07-07 NOTE — H&P
"[facility] H&P      Consult: Abdominal pain and dysphagia  Consulting Service: Meagan Mendoza NP  Chief Complaint: Abdominal pain, constipation, bloating, gas, dysphagia        HPI: Pt is a 50 y.o.female with an extensive GI medical history: gastroparesis, colitis, chronic constipation, GERD, esophageal stricture who presents for generalized abdominal pain, dysphagia with pills and food, constipation, gas, and bloating. Last colonoscopy in 2020. Last EGD in 2023. No personal or family history of CRC. Denies melena, rectal bleeding or pain, change in bowel habits, unintended weight loss, or vomiting. Reports she is currently taking motegrity and miralax. Reports bms about twice a week with straining. C/o hemorrhoids.         PMH:   Past Medical History:   Diagnosis Date    Allergic rhinitis     Anxiety     Chronic constipation     sees Dr. Martinez, been scoped x1    Colitis     Depression     Gastroparesis     GERD (gastroesophageal reflux disease)     History of colonoscopy     Migraine headache      PSH:   Past Surgical History:   Procedure Laterality Date    COLONOSCOPY      HYSTERECTOMY      OOPHORECTOMY      UPPER GASTROINTESTINAL ENDOSCOPY       Meds: See medication list;  No anticoagulants  ALL: Patient has no known allergies.  FHX: see above   SOC: Social History[1]  ROS: Review of Systems   Constitutional:  Negative for chills, diaphoresis, fever, malaise/fatigue and weight loss.   HENT:          Dysphagia    Respiratory:  Negative for cough, hemoptysis and shortness of breath.    Cardiovascular:  Negative for chest pain, palpitations and leg swelling.   Gastrointestinal:  Positive for abdominal pain, constipation and nausea. Negative for blood in stool, diarrhea, heartburn, melena and vomiting.   All other systems reviewed and are negative.          Physical Exam:  /88   Pulse 77   Resp 18   Ht 5' 7" (1.702 m)   Wt 85.6 kg (188 lb 13.2 oz)   SpO2 98%   BMI 29.57 " kg/m²   Physical Exam  Vitals reviewed.   Constitutional:       General: She is not in acute distress.     Appearance: Normal appearance. She is overweight. She is not ill-appearing, toxic-appearing or diaphoretic.   HENT:      Head: Normocephalic and atraumatic.      Mouth/Throat:      Mouth: Mucous membranes are moist.   Eyes:      Conjunctiva/sclera: Conjunctivae normal.   Cardiovascular:      Rate and Rhythm: Normal rate and regular rhythm.   Pulmonary:      Effort: Pulmonary effort is normal. No respiratory distress.   Abdominal:      General: There is no distension.      Palpations: Abdomen is soft.      Tenderness: There is no abdominal tenderness. There is no guarding or rebound.   Musculoskeletal:         General: Normal range of motion.      Cervical back: Normal range of motion.   Skin:     General: Skin is warm.      Capillary Refill: Capillary refill takes less than 2 seconds.   Neurological:      Mental Status: She is alert and oriented to person, place, and time. Mental status is at baseline.   Psychiatric:         Mood and Affect: Mood normal.         Behavior: Behavior normal.         Thought Content: Thought content normal.         Judgment: Judgment normal.               A/P: Pt is a 50 y.o.female who presents for Abdominal pain and dysphagia  --To Endo on 7/16 for EGD  --Procedure explained in detail to patient; including risks of but not limited to bleeding, infection, damage to surrounding structures, and perforation- patient voiced understanding  --Consent obtained and signed  --Pre-op orders placed  --Instructions reviewed and given to patient   --NPO after midnight  --Request cardiac clearance to Dr. Yin  --Continue good water intake  --Gallbladder US  --Protonix daily  --Anusol bid x14 days  --Start fiber supplement  --Continue daily miralax  --RTC after imaging and endoscopy   --Reviewed external labs, imaging, records  --Spent a total combined 60 minutes with pt and reviewing chart          Agus Kaye NP  General Surgery   [phone]       [1]   Social History  Socioeconomic History    Marital status: Single   Tobacco Use    Smoking status: Never     Passive exposure: Never    Smokeless tobacco: Never   Substance and Sexual Activity    Alcohol use: Yes     Comment: Social    Drug use: No    Sexual activity: Yes     Partners: Male     Birth control/protection: Surgical     Social Drivers of Health     Financial Resource Strain: Medium Risk (6/24/2025)    Overall Financial Resource Strain (CARDIA)     Difficulty of Paying Living Expenses: Somewhat hard   Food Insecurity: Food Insecurity Present (6/24/2025)    Hunger Vital Sign     Worried About Running Out of Food in the Last Year: Sometimes true     Ran Out of Food in the Last Year: Sometimes true   Transportation Needs: Unmet Transportation Needs (6/24/2025)    PRAPARE - Transportation     Lack of Transportation (Medical): Yes     Lack of Transportation (Non-Medical): Yes   Physical Activity: Insufficiently Active (6/24/2025)    Exercise Vital Sign     Days of Exercise per Week: 2 days     Minutes of Exercise per Session: 10 min   Stress: No Stress Concern Present (6/24/2025)    [employer] - Occupational Stress Questionnaire     Feeling of Stress : Only a little   Housing Stability: Low Risk  (6/24/2025)    Housing Stability Vital Sign     Unable to Pay for Housing in the Last Year: No     Homeless in the Last Year: No

## 2025-07-09 ENCOUNTER — HOSPITAL ENCOUNTER (OUTPATIENT)
Dept: PREADMISSION TESTING | Facility: HOSPITAL | Age: 50
Discharge: HOME | End: 2025-07-09
Payer: MEDICAID

## 2025-07-09 VITALS — WEIGHT: 188 LBS | BODY MASS INDEX: 29.51 KG/M2 | HEIGHT: 67 IN

## 2025-07-11 ENCOUNTER — HOSPITAL ENCOUNTER (OUTPATIENT)
Dept: RADIOLOGY | Facility: HOSPITAL | Age: 50
Discharge: HOME | End: 2025-07-11
Payer: MEDICAID

## 2025-07-11 DIAGNOSIS — R10.84 GENERALIZED ABDOMINAL PAIN: ICD-10-CM

## 2025-07-11 PROCEDURE — 76705 ECHO EXAM OF ABDOMEN: CPT | Mod: TC

## 2025-07-28 ENCOUNTER — HOSPITAL ENCOUNTER (OUTPATIENT)
Dept: RADIOLOGY | Facility: HOSPITAL | Age: 50
Discharge: HOME | End: 2025-07-28

## 2025-07-28 DIAGNOSIS — R10.84 GENERALIZED ABDOMINAL PAIN: ICD-10-CM

## 2025-07-28 PROCEDURE — 78227 HEPATOBIL SYST IMAGE W/DRUG: CPT | Mod: TC

## 2025-07-28 PROCEDURE — A9537 TC99M MEBROFENIN: HCPCS

## 2025-07-28 PROCEDURE — 63600175 PHARM REV CODE 636 W HCPCS

## 2025-07-28 PROCEDURE — 78227 HEPATOBIL SYST IMAGE W/DRUG: CPT | Mod: 26,,, | Performed by: RADIOLOGY

## 2025-07-28 RX ORDER — SINCALIDE 5 UG/5ML
0.02 INJECTION, POWDER, LYOPHILIZED, FOR SOLUTION INTRAVENOUS ONCE
Status: COMPLETED | OUTPATIENT
Start: 2025-07-28 | End: 2025-07-28

## 2025-07-28 RX ORDER — KIT FOR THE PREPARATION OF TECHNETIUM TC 99M MEBROFENIN 45 MG/10ML
5.5 INJECTION, POWDER, LYOPHILIZED, FOR SOLUTION INTRAVENOUS
Status: COMPLETED | OUTPATIENT
Start: 2025-07-28 | End: 2025-07-28

## 2025-07-28 RX ADMIN — SINCALIDE 1.6 MCG: 5 INJECTION, POWDER, LYOPHILIZED, FOR SOLUTION INTRAVENOUS at 10:07

## 2025-07-28 RX ADMIN — MEBROFENIN 5.5 MILLICURIE: 45 INJECTION, POWDER, LYOPHILIZED, FOR SOLUTION INTRAVENOUS at 10:07

## 2025-08-04 ENCOUNTER — ANESTHESIA EVENT (OUTPATIENT)
Dept: ENDOSCOPY | Facility: HOSPITAL | Age: 50
End: 2025-08-04
Payer: MEDICAID

## 2025-08-04 NOTE — ANESTHESIA PREPROCEDURE EVALUATION
08/04/2025  Noah Lantigua is a 50 y.o., female.      Pre-op Assessment    I have reviewed the Patient Summary Reports.    I have reviewed the NPO Status.   I have reviewed the Medications.     Review of Systems  Anesthesia Hx:  No problems with previous Anesthesia             Denies Family Hx of Anesthesia complications.    Denies Personal Hx of Anesthesia complications.                    Social:  Non-Smoker       Cardiovascular:  Cardiovascular Normal                  ECG has been reviewed.                            Pulmonary:  Pulmonary Normal                       Renal/:  Renal/ Normal                 Hepatic/GI:     GERD, poorly controlled   gastroparesis             Musculoskeletal:  Arthritis               Neurological:      Headaches                                 Endocrine:  Endocrine Normal            Psych:   anxiety               Lab Results   Component Value Date    WBC 4.08 06/27/2025    HGB 14.9 06/27/2025    HCT 44.1 06/27/2025    MCV 85 06/27/2025     06/27/2025      CMP  Sodium   Date Value Ref Range Status   06/27/2025 140 136 - 145 mmol/L Final   05/06/2024 138 136 - 145 mmol/L Final     Potassium   Date Value Ref Range Status   06/27/2025 3.9 3.5 - 5.1 mmol/L Final   05/06/2024 4.7 3.5 - 5.1 mmol/L Final     Comment:     Specimen slightly hemolyzed     Chloride   Date Value Ref Range Status   06/27/2025 109 95 - 110 mmol/L Final   05/06/2024 108 95 - 110 mmol/L Final     CO2   Date Value Ref Range Status   06/27/2025 23 23 - 29 mmol/L Final   05/06/2024 23 23 - 29 mmol/L Final     Glucose   Date Value Ref Range Status   06/27/2025 109 70 - 110 mg/dL Final   05/06/2024 95 70 - 110 mg/dL Final     BUN   Date Value Ref Range Status   06/27/2025 10 6 - 20 mg/dL Final     Creatinine   Date Value Ref Range Status   06/27/2025 1.1 0.5 - 1.4 mg/dL Final     Calcium   Date Value  Ref Range Status   06/27/2025 9.3 8.7 - 10.5 mg/dL Final   05/06/2024 8.8 8.7 - 10.5 mg/dL Final     Protein Total   Date Value Ref Range Status   06/27/2025 7.7 6.0 - 8.4 gm/dL Final     Total Protein   Date Value Ref Range Status   05/06/2024 6.4 6.0 - 8.4 g/dL Final     Albumin   Date Value Ref Range Status   06/27/2025 4.1 3.5 - 5.2 g/dL Final   05/06/2024 3.5 3.5 - 5.2 g/dL Final     Total Bilirubin   Date Value Ref Range Status   05/06/2024 0.9 0.1 - 1.0 mg/dL Final     Comment:     For infants and newborns, interpretation of results should be based  on gestational age, weight and in agreement with clinical  observations.    Premature Infant recommended reference ranges:  Up to 24 hours.............<8.0 mg/dL  Up to 48 hours............<12.0 mg/dL  3-5 days..................<15.0 mg/dL  6-29 days.................<15.0 mg/dL       Bilirubin Total   Date Value Ref Range Status   06/27/2025 0.9 0.1 - 1.0 mg/dL Final     Comment:     For infants and newborns, interpretation of results should be based   on gestational age, weight and in agreement with clinical   observations.    Premature Infant recommended reference ranges:   0-24 hours:  <8.0 mg/dL   24-48 hours: <12.0 mg/dL   3-5 days:    <15.0 mg/dL   6-29 days:   <15.0 mg/dL     Alkaline Phosphatase   Date Value Ref Range Status   05/06/2024 78 55 - 135 U/L Final     ALP   Date Value Ref Range Status   06/27/2025 105 40 - 150 unit/L Final     AST   Date Value Ref Range Status   06/27/2025 21 11 - 45 unit/L Final   05/06/2024 18 10 - 40 U/L Final     ALT   Date Value Ref Range Status   06/27/2025 12 10 - 44 unit/L Final   05/06/2024 13 10 - 44 U/L Final     Anion Gap   Date Value Ref Range Status   06/27/2025 8 8 - 16 mmol/L Final     eGFR   Date Value Ref Range Status   06/27/2025 >60 >60 mL/min/1.73/m2 Final     Comment:     Estimated GFR calculated using the CKD-EPI creatinine (2021) equation.   05/06/2024 >60.0 >60 mL/min/1.73 m^2 Final        Physical  Exam  General: Well nourished    Airway:  Mallampati: II / I  Mouth Opening: Normal  TM Distance: Normal  Tongue: Normal  Neck ROM: Normal ROM    Dental:  Intact    Chest/Lungs:  Clear to auscultation    Heart:  Rate: Normal  Rhythm: Regular Rhythm  Sounds: Normal        Anesthesia Plan  Type of Anesthesia, risks & benefits discussed:    Anesthesia Type: MAC  Intra-op Monitoring Plan: Standard ASA Monitors  Post Op Pain Control Plan: multimodal analgesia  Induction:  IV  Airway Plan: Direct  Informed Consent: Informed consent signed with the Patient and all parties understand the risks and agree with anesthesia plan.  All questions answered.   ASA Score: 2  Day of Surgery Review of History & Physical: I have interviewed and examined the patient. I have reviewed the patient's H&P dated: There are no significant changes.     Ready For Surgery From Anesthesia Perspective.     .

## 2025-08-05 ENCOUNTER — ANESTHESIA (OUTPATIENT)
Dept: ENDOSCOPY | Facility: HOSPITAL | Age: 50
End: 2025-08-05
Payer: MEDICAID

## 2025-08-05 ENCOUNTER — HOSPITAL ENCOUNTER (OUTPATIENT)
Facility: HOSPITAL | Age: 50
Discharge: HOME | End: 2025-08-05
Attending: STUDENT IN AN ORGANIZED HEALTH CARE EDUCATION/TRAINING PROGRAM | Admitting: STUDENT IN AN ORGANIZED HEALTH CARE EDUCATION/TRAINING PROGRAM
Payer: MEDICAID

## 2025-08-05 DIAGNOSIS — R10.84 GENERALIZED ABDOMINAL PAIN: ICD-10-CM

## 2025-08-05 DIAGNOSIS — K21.9 GASTROESOPHAGEAL REFLUX DISEASE, UNSPECIFIED WHETHER ESOPHAGITIS PRESENT: ICD-10-CM

## 2025-08-05 DIAGNOSIS — R13.10 DYSPHAGIA, UNSPECIFIED TYPE: Primary | ICD-10-CM

## 2025-08-05 PROCEDURE — 25000003 PHARM REV CODE 250: Performed by: STUDENT IN AN ORGANIZED HEALTH CARE EDUCATION/TRAINING PROGRAM

## 2025-08-05 PROCEDURE — 43235 EGD DIAGNOSTIC BRUSH WASH: CPT | Performed by: STUDENT IN AN ORGANIZED HEALTH CARE EDUCATION/TRAINING PROGRAM

## 2025-08-05 PROCEDURE — 43235 EGD DIAGNOSTIC BRUSH WASH: CPT | Mod: ,,, | Performed by: STUDENT IN AN ORGANIZED HEALTH CARE EDUCATION/TRAINING PROGRAM

## 2025-08-05 PROCEDURE — 37000009 HC ANESTHESIA EA ADD 15 MINS: Performed by: STUDENT IN AN ORGANIZED HEALTH CARE EDUCATION/TRAINING PROGRAM

## 2025-08-05 PROCEDURE — 37000008 HC ANESTHESIA 1ST 15 MINUTES: Performed by: STUDENT IN AN ORGANIZED HEALTH CARE EDUCATION/TRAINING PROGRAM

## 2025-08-05 PROCEDURE — 63600175 PHARM REV CODE 636 W HCPCS: Performed by: NURSE ANESTHETIST, CERTIFIED REGISTERED

## 2025-08-05 RX ORDER — TOPICAL ANESTHETIC 200 MG/ML
SPRAY DENTAL; PERIODONTAL
Status: DISCONTINUED | OUTPATIENT
Start: 2025-08-05 | End: 2025-08-05 | Stop reason: HOSPADM

## 2025-08-05 RX ORDER — SODIUM CHLORIDE 0.9 % (FLUSH) 0.9 %
10 SYRINGE (ML) INJECTION
Status: DISCONTINUED | OUTPATIENT
Start: 2025-08-05 | End: 2025-08-05 | Stop reason: HOSPADM

## 2025-08-05 RX ORDER — PROPOFOL 10 MG/ML
INJECTION, EMULSION INTRAVENOUS
Status: DISCONTINUED | OUTPATIENT
Start: 2025-08-05 | End: 2025-08-05

## 2025-08-05 RX ORDER — SODIUM CHLORIDE 9 MG/ML
INJECTION, SOLUTION INTRAVENOUS CONTINUOUS
Status: DISCONTINUED | OUTPATIENT
Start: 2025-08-05 | End: 2025-08-05 | Stop reason: HOSPADM

## 2025-08-05 RX ADMIN — PROPOFOL 40 MG: 10 INJECTION, EMULSION INTRAVENOUS at 07:08

## 2025-08-05 RX ADMIN — PROPOFOL 90 MG: 10 INJECTION, EMULSION INTRAVENOUS at 07:08

## 2025-08-05 RX ADMIN — PROPOFOL 30 MG: 10 INJECTION, EMULSION INTRAVENOUS at 07:08

## 2025-08-05 NOTE — TRANSFER OF CARE
Anesthesia Transfer of Care Note    Patient: Ursala Nadeem Lantigua    Procedure(s) Performed: Procedure(s) (LRB):  EGD (ESOPHAGOGASTRODUODENOSCOPY) (N/A)    Patient location: OPS    Anesthesia Type: MAC    Transport from OR: Transported from OR on room air with adequate spontaneous ventilation    Post pain: adequate analgesia    Post assessment: no apparent anesthetic complications    Post vital signs: stable    Level of consciousness: awake, alert and oriented    Nausea/Vomiting: no nausea/vomiting    Complications: none    Transfer of care protocol was followed      Last vitals:     /84  P 71  R 14  O2 Sat 96%

## 2025-08-06 ENCOUNTER — TELEPHONE (OUTPATIENT)
Dept: SURGERY | Facility: CLINIC | Age: 50
End: 2025-08-06
Payer: MEDICAID

## 2025-08-06 NOTE — ANESTHESIA POSTPROCEDURE EVALUATION
Anesthesia Post Evaluation    Patient: Noah Silver Lantigua    Procedure(s) Performed: Procedure(s) (LRB):  EGD (ESOPHAGOGASTRODUODENOSCOPY) (N/A)    Final Anesthesia Type: MAC      Patient location during evaluation: OPS  Patient participation: Yes- Able to Participate  Level of consciousness: awake and alert and oriented  Post-procedure vital signs: reviewed and stable  Pain management: adequate  Airway patency: patent    PONV status at discharge: No PONV  Anesthetic complications: no      Cardiovascular status: blood pressure returned to baseline  Respiratory status: spontaneous ventilation, unassisted and room air  Hydration status: euvolemic  Follow-up not needed.              Vitals Value Taken Time   /74 08/05/25 08:16   Temp 36.7 °C (98 °F) 08/05/25 08:16   Pulse 57 08/05/25 08:16   Resp 20 08/05/25 08:16   SpO2 99 % 08/05/25 08:16         No case tracking events are documented in the log.      Pain/Toñito Score: Toñito Score: 10 (8/5/2025  8:16 AM)

## 2025-08-07 ENCOUNTER — OFFICE VISIT (OUTPATIENT)
Dept: SURGERY | Facility: CLINIC | Age: 50
End: 2025-08-07
Payer: MEDICAID

## 2025-08-07 VITALS
HEIGHT: 67 IN | DIASTOLIC BLOOD PRESSURE: 74 MMHG | WEIGHT: 186.94 LBS | OXYGEN SATURATION: 99 % | RESPIRATION RATE: 18 BRPM | DIASTOLIC BLOOD PRESSURE: 79 MMHG | OXYGEN SATURATION: 98 % | BODY MASS INDEX: 29.34 KG/M2 | SYSTOLIC BLOOD PRESSURE: 115 MMHG | TEMPERATURE: 98 F | SYSTOLIC BLOOD PRESSURE: 117 MMHG | HEART RATE: 65 BPM | RESPIRATION RATE: 20 BRPM | HEART RATE: 57 BPM

## 2025-08-07 DIAGNOSIS — K59.09 CHRONIC CONSTIPATION: ICD-10-CM

## 2025-08-07 DIAGNOSIS — R94.8 ABNORMAL BILIARY HIDA SCAN: Primary | ICD-10-CM

## 2025-08-07 DIAGNOSIS — K82.8 OTHER SPECIFIED DISEASES OF GALLBLADDER: ICD-10-CM

## 2025-08-07 DIAGNOSIS — R10.13 EPIGASTRIC PAIN: ICD-10-CM

## 2025-08-07 DIAGNOSIS — K31.84 GASTROPARESIS: ICD-10-CM

## 2025-08-07 DIAGNOSIS — K21.9 GASTROESOPHAGEAL REFLUX DISEASE WITHOUT ESOPHAGITIS: ICD-10-CM

## 2025-08-07 PROCEDURE — 99213 OFFICE O/P EST LOW 20 MIN: CPT | Mod: S$PBB,,,

## 2025-08-07 PROCEDURE — 3008F BODY MASS INDEX DOCD: CPT | Mod: CPTII,,,

## 2025-08-07 PROCEDURE — 3078F DIAST BP <80 MM HG: CPT | Mod: CPTII,,,

## 2025-08-07 PROCEDURE — 99999 PR PBB SHADOW E&M-EST. PATIENT-LVL III: CPT | Mod: PBBFAC,,,

## 2025-08-07 PROCEDURE — 1159F MED LIST DOCD IN RCRD: CPT | Mod: CPTII,,,

## 2025-08-07 PROCEDURE — 99213 OFFICE O/P EST LOW 20 MIN: CPT | Mod: PBBFAC

## 2025-08-07 PROCEDURE — 3074F SYST BP LT 130 MM HG: CPT | Mod: CPTII,,,

## 2025-08-07 RX ORDER — SUCRALFATE 1 G/1
1 TABLET ORAL 4 TIMES DAILY
Qty: 120 TABLET | Refills: 0 | Status: SHIPPED | OUTPATIENT
Start: 2025-08-07 | End: 2025-09-06

## 2025-08-07 NOTE — PROGRESS NOTES
"[facility]  General [facility] Progress Note    HPI:  Noah Lantigua is a 50 y.o. female with a PMHx of gastroparesis, colitis, chronic constipation, GERD, esophageal stricture s/p EGD on 8/5 who presents for follow up after HIDA scan. Reports taking motegrity and protonix. Reports continued symptoms of epigastric pain, constipation, nausea, bloating after every meal and liquids.    ROS:  Negative except above    PE:  Vitals:    08/07/25 1326   BP: 117/79   Pulse: 65   Resp: 18   SpO2: 98%   Weight: 84.8 kg (186 lb 15.2 oz)   Height: 5' 7" (1.702 m)        Gen: Alert and oriented x3, judgement intact, NAD  CV: RRR  Resp: CTAB; breaths non-labored and symmetrical  Abd: Soft, NT, ND, BS+  Extremities: No c/c/e    Pathology: None    Imaging:         A/P:  -EGD WNL  -Gallbladder US WNL  -HIDA scan resulted 88% gallbladder EF  -Continue motegrity and protonix  -Start carafate achs  -If carafate fails, will discuss lap vs open cholecystectomy   -RTC 2 weeks       Agus Kaye NP  General Surgery   [phone]  "

## 2025-08-12 DIAGNOSIS — E78.00 HYPERCHOLESTEREMIA: Primary | ICD-10-CM

## 2025-08-12 RX ORDER — ONDANSETRON HYDROCHLORIDE 2 MG/ML
8 INJECTION, SOLUTION INTRAVENOUS ONCE AS NEEDED
OUTPATIENT
Start: 2025-09-01

## 2025-08-12 RX ORDER — EPINEPHRINE 0.3 MG/.3ML
0.3 INJECTION SUBCUTANEOUS ONCE AS NEEDED
OUTPATIENT
Start: 2025-09-01

## 2025-08-12 RX ORDER — METHYLPREDNISOLONE SOD SUCC 125 MG
125 VIAL (EA) INJECTION ONCE AS NEEDED
OUTPATIENT
Start: 2025-09-01

## 2025-08-12 RX ORDER — ACETAMINOPHEN 325 MG/1
650 TABLET ORAL ONCE
OUTPATIENT
Start: 2025-09-01 | End: 2025-09-01

## 2025-08-12 RX ORDER — METHYLPREDNISOLONE SOD SUCC 125 MG
80 VIAL (EA) INJECTION ONCE
OUTPATIENT
Start: 2025-09-01 | End: 2025-09-01

## 2025-08-12 RX ORDER — DIPHENHYDRAMINE HYDROCHLORIDE 50 MG/ML
50 INJECTION, SOLUTION INTRAMUSCULAR; INTRAVENOUS ONCE AS NEEDED
OUTPATIENT
Start: 2025-09-01

## 2025-08-20 ENCOUNTER — TELEPHONE (OUTPATIENT)
Dept: SURGERY | Facility: CLINIC | Age: 50
End: 2025-08-20
Payer: MEDICAID

## 2025-08-21 ENCOUNTER — INFUSION (OUTPATIENT)
Dept: INFUSION THERAPY | Facility: HOSPITAL | Age: 50
End: 2025-08-21
Attending: INTERNAL MEDICINE
Payer: MEDICAID

## 2025-08-21 ENCOUNTER — OFFICE VISIT (OUTPATIENT)
Dept: SURGERY | Facility: CLINIC | Age: 50
End: 2025-08-21
Payer: MEDICAID

## 2025-08-21 VITALS
SYSTOLIC BLOOD PRESSURE: 116 MMHG | RESPIRATION RATE: 18 BRPM | HEART RATE: 71 BPM | DIASTOLIC BLOOD PRESSURE: 91 MMHG | OXYGEN SATURATION: 98 % | TEMPERATURE: 99 F | SYSTOLIC BLOOD PRESSURE: 127 MMHG | WEIGHT: 184.19 LBS | BODY MASS INDEX: 31.45 KG/M2 | HEIGHT: 64 IN | DIASTOLIC BLOOD PRESSURE: 82 MMHG | OXYGEN SATURATION: 98 % | RESPIRATION RATE: 18 BRPM | HEART RATE: 86 BPM

## 2025-08-21 DIAGNOSIS — K59.09 CHRONIC CONSTIPATION: ICD-10-CM

## 2025-08-21 DIAGNOSIS — Z01.818 PREOPERATIVE CLEARANCE: ICD-10-CM

## 2025-08-21 DIAGNOSIS — E78.00 HYPERCHOLESTEREMIA: Primary | ICD-10-CM

## 2025-08-21 DIAGNOSIS — R94.8 ABNORMAL BILIARY HIDA SCAN: ICD-10-CM

## 2025-08-21 DIAGNOSIS — K21.9 GASTROESOPHAGEAL REFLUX DISEASE WITHOUT ESOPHAGITIS: ICD-10-CM

## 2025-08-21 DIAGNOSIS — R10.13 EPIGASTRIC PAIN: ICD-10-CM

## 2025-08-21 DIAGNOSIS — K31.84 GASTROPARESIS: ICD-10-CM

## 2025-08-21 DIAGNOSIS — R93.2 ABNORMAL FINDINGS ON DIAGNOSTIC IMAGING OF GALL BLADDER: Primary | ICD-10-CM

## 2025-08-21 PROCEDURE — 1159F MED LIST DOCD IN RCRD: CPT | Mod: CPTII,,,

## 2025-08-21 PROCEDURE — 3008F BODY MASS INDEX DOCD: CPT | Mod: CPTII,,,

## 2025-08-21 PROCEDURE — 99214 OFFICE O/P EST MOD 30 MIN: CPT | Mod: S$PBB,,,

## 2025-08-21 PROCEDURE — 63600175 PHARM REV CODE 636 W HCPCS: Mod: JZ,TB | Performed by: PHYSICIAN ASSISTANT

## 2025-08-21 PROCEDURE — 96372 THER/PROPH/DIAG INJ SC/IM: CPT

## 2025-08-21 PROCEDURE — 3079F DIAST BP 80-89 MM HG: CPT | Mod: CPTII,,,

## 2025-08-21 PROCEDURE — 1160F RVW MEDS BY RX/DR IN RCRD: CPT | Mod: CPTII,,,

## 2025-08-21 PROCEDURE — 3074F SYST BP LT 130 MM HG: CPT | Mod: CPTII,,,

## 2025-08-21 PROCEDURE — 99215 OFFICE O/P EST HI 40 MIN: CPT | Mod: PBBFAC,25

## 2025-08-21 PROCEDURE — 99999 PR PBB SHADOW E&M-EST. PATIENT-LVL V: CPT | Mod: PBBFAC,,,

## 2025-08-21 RX ORDER — CEFAZOLIN SODIUM 2 G/50ML
2 SOLUTION INTRAVENOUS
OUTPATIENT
Start: 2025-08-21

## 2025-08-21 RX ORDER — DIPHENHYDRAMINE HYDROCHLORIDE 50 MG/ML
50 INJECTION, SOLUTION INTRAMUSCULAR; INTRAVENOUS ONCE AS NEEDED
Status: DISCONTINUED | OUTPATIENT
Start: 2025-08-21 | End: 2025-08-21 | Stop reason: HOSPADM

## 2025-08-21 RX ORDER — METHYLPREDNISOLONE SOD SUCC 125 MG
125 VIAL (EA) INJECTION ONCE AS NEEDED
Status: DISCONTINUED | OUTPATIENT
Start: 2025-08-21 | End: 2025-08-21 | Stop reason: HOSPADM

## 2025-08-21 RX ORDER — ONDANSETRON 4 MG/1
8 TABLET, ORALLY DISINTEGRATING ORAL EVERY 8 HOURS PRN
OUTPATIENT
Start: 2025-08-21

## 2025-08-21 RX ORDER — EPINEPHRINE 0.3 MG/.3ML
0.3 INJECTION SUBCUTANEOUS ONCE AS NEEDED
OUTPATIENT
Start: 2025-08-21 | End: 2037-01-16

## 2025-08-21 RX ORDER — SODIUM CHLORIDE 9 MG/ML
INJECTION, SOLUTION INTRAVENOUS CONTINUOUS
OUTPATIENT
Start: 2025-08-21

## 2025-08-21 RX ORDER — METHYLPREDNISOLONE SOD SUCC 125 MG
80 VIAL (EA) INJECTION ONCE
Status: DISCONTINUED | OUTPATIENT
Start: 2025-08-21 | End: 2025-08-21 | Stop reason: HOSPADM

## 2025-08-21 RX ORDER — ONDANSETRON HYDROCHLORIDE 2 MG/ML
8 INJECTION, SOLUTION INTRAVENOUS ONCE AS NEEDED
Status: DISCONTINUED | OUTPATIENT
Start: 2025-08-21 | End: 2025-08-21 | Stop reason: HOSPADM

## 2025-08-21 RX ORDER — DIPHENHYDRAMINE HYDROCHLORIDE 50 MG/ML
50 INJECTION, SOLUTION INTRAMUSCULAR; INTRAVENOUS ONCE AS NEEDED
OUTPATIENT
Start: 2025-08-21 | End: 2037-01-16

## 2025-08-21 RX ORDER — METHYLPREDNISOLONE SOD SUCC 125 MG
125 VIAL (EA) INJECTION ONCE AS NEEDED
OUTPATIENT
Start: 2025-08-21 | End: 2037-01-16

## 2025-08-21 RX ORDER — ACETAMINOPHEN 325 MG/1
650 TABLET ORAL ONCE
Status: DISCONTINUED | OUTPATIENT
Start: 2025-08-21 | End: 2025-08-21 | Stop reason: HOSPADM

## 2025-08-21 RX ORDER — ONDANSETRON HYDROCHLORIDE 2 MG/ML
4 INJECTION, SOLUTION INTRAVENOUS EVERY 12 HOURS PRN
OUTPATIENT
Start: 2025-08-21

## 2025-08-21 RX ORDER — EPINEPHRINE 0.3 MG/.3ML
0.3 INJECTION SUBCUTANEOUS ONCE AS NEEDED
Status: DISCONTINUED | OUTPATIENT
Start: 2025-08-21 | End: 2025-08-21 | Stop reason: HOSPADM

## 2025-08-21 RX ORDER — METHYLPREDNISOLONE SOD SUCC 125 MG
80 VIAL (EA) INJECTION ONCE
OUTPATIENT
Start: 2025-08-21 | End: 2025-08-21

## 2025-08-21 RX ORDER — ACETAMINOPHEN 325 MG/1
650 TABLET ORAL ONCE
OUTPATIENT
Start: 2025-08-21 | End: 2025-08-21

## 2025-08-21 RX ORDER — ONDANSETRON HYDROCHLORIDE 2 MG/ML
8 INJECTION, SOLUTION INTRAVENOUS ONCE AS NEEDED
OUTPATIENT
Start: 2025-08-21 | End: 2037-01-16

## 2025-08-21 RX ADMIN — INCLISIRAN 284 MG: 284 INJECTION, SOLUTION SUBCUTANEOUS at 09:08

## 2025-08-22 ENCOUNTER — TELEPHONE (OUTPATIENT)
Dept: SURGERY | Facility: CLINIC | Age: 50
End: 2025-08-22
Payer: MEDICAID

## 2025-08-27 ENCOUNTER — HOSPITAL ENCOUNTER (OUTPATIENT)
Dept: RADIOLOGY | Facility: HOSPITAL | Age: 50
Discharge: HOME | End: 2025-08-27
Payer: MEDICAID

## 2025-08-27 ENCOUNTER — HOSPITAL ENCOUNTER (OUTPATIENT)
Dept: PREADMISSION TESTING | Facility: HOSPITAL | Age: 50
Discharge: HOME | End: 2025-08-27
Payer: MEDICAID

## 2025-08-27 VITALS — HEIGHT: 67 IN | WEIGHT: 185 LBS | BODY MASS INDEX: 29.03 KG/M2

## 2025-08-27 DIAGNOSIS — Z01.818 PREOPERATIVE CLEARANCE: ICD-10-CM

## 2025-08-27 PROCEDURE — 71045 X-RAY EXAM CHEST 1 VIEW: CPT | Mod: TC

## 2025-09-02 ENCOUNTER — ANESTHESIA EVENT (OUTPATIENT)
Dept: SURGERY | Facility: HOSPITAL | Age: 50
End: 2025-09-02
Payer: MEDICAID

## 2025-09-03 ENCOUNTER — ANESTHESIA (OUTPATIENT)
Dept: SURGERY | Facility: HOSPITAL | Age: 50
End: 2025-09-03
Payer: MEDICAID

## 2025-09-03 PROBLEM — K82.8 BILIARY DYSKINESIA: Status: ACTIVE | Noted: 2025-09-03

## 2025-09-03 PROBLEM — R93.2 ABNORMAL FINDINGS ON DIAGNOSTIC IMAGING OF GALL BLADDER: Status: ACTIVE | Noted: 2025-09-03

## 2025-09-03 PROCEDURE — 25000003 PHARM REV CODE 250: Performed by: NURSE ANESTHETIST, CERTIFIED REGISTERED

## 2025-09-03 PROCEDURE — 63600175 PHARM REV CODE 636 W HCPCS: Performed by: NURSE ANESTHETIST, CERTIFIED REGISTERED

## 2025-09-03 PROCEDURE — 25000003 PHARM REV CODE 250

## 2025-09-03 RX ORDER — GLYCOPYRROLATE 0.2 MG/ML
INJECTION, SOLUTION INTRAMUSCULAR; INTRAVENOUS
Status: DISCONTINUED | OUTPATIENT
Start: 2025-09-03 | End: 2025-09-03

## 2025-09-03 RX ORDER — MIDAZOLAM HYDROCHLORIDE 1 MG/ML
INJECTION INTRAMUSCULAR; INTRAVENOUS
Status: DISCONTINUED | OUTPATIENT
Start: 2025-09-03 | End: 2025-09-03

## 2025-09-03 RX ORDER — PROPOFOL 10 MG/ML
VIAL (ML) INTRAVENOUS
Status: DISCONTINUED | OUTPATIENT
Start: 2025-09-03 | End: 2025-09-03

## 2025-09-03 RX ORDER — FENTANYL CITRATE 50 UG/ML
INJECTION, SOLUTION INTRAMUSCULAR; INTRAVENOUS
Status: DISCONTINUED | OUTPATIENT
Start: 2025-09-03 | End: 2025-09-03

## 2025-09-03 RX ORDER — NEOSTIGMINE METHYLSULFATE 5 MG/5 ML
SYRINGE (ML) INTRAVENOUS
Status: DISCONTINUED | OUTPATIENT
Start: 2025-09-03 | End: 2025-09-03

## 2025-09-03 RX ORDER — HYDROMORPHONE HYDROCHLORIDE 2 MG/ML
INJECTION, SOLUTION INTRAMUSCULAR; INTRAVENOUS; SUBCUTANEOUS
Status: DISCONTINUED | OUTPATIENT
Start: 2025-09-03 | End: 2025-09-03

## 2025-09-03 RX ORDER — LIDOCAINE HYDROCHLORIDE 10 MG/ML
INJECTION, SOLUTION EPIDURAL; INFILTRATION; INTRACAUDAL; PERINEURAL
Status: DISCONTINUED | OUTPATIENT
Start: 2025-09-03 | End: 2025-09-03

## 2025-09-03 RX ORDER — ROCURONIUM BROMIDE 10 MG/ML
INJECTION, SOLUTION INTRAVENOUS
Status: DISCONTINUED | OUTPATIENT
Start: 2025-09-03 | End: 2025-09-03

## 2025-09-03 RX ADMIN — ROCURONIUM BROMIDE 40 MG: 10 INJECTION, SOLUTION INTRAVENOUS at 10:09

## 2025-09-03 RX ADMIN — MIDAZOLAM 2 MG: 1 INJECTION INTRAMUSCULAR; INTRAVENOUS at 10:09

## 2025-09-03 RX ADMIN — SODIUM CHLORIDE: 9 INJECTION, SOLUTION INTRAVENOUS at 12:09

## 2025-09-03 RX ADMIN — LIDOCAINE HYDROCHLORIDE 50 MG: 10 INJECTION, SOLUTION EPIDURAL; INFILTRATION; INTRACAUDAL; PERINEURAL at 10:09

## 2025-09-03 RX ADMIN — FENTANYL CITRATE 100 MCG: 50 INJECTION, SOLUTION INTRAMUSCULAR; INTRAVENOUS at 10:09

## 2025-09-03 RX ADMIN — PROPOFOL 200 MG: 10 INJECTION, EMULSION INTRAVENOUS at 10:09

## 2025-09-03 RX ADMIN — HYDROMORPHONE HYDROCHLORIDE 0.5 MG: 2 INJECTION, SOLUTION INTRAMUSCULAR; INTRAVENOUS; SUBCUTANEOUS at 12:09

## 2025-09-03 RX ADMIN — FENTANYL CITRATE 50 MCG: 50 INJECTION, SOLUTION INTRAMUSCULAR; INTRAVENOUS at 10:09

## 2025-09-03 RX ADMIN — Medication 5 MG: at 12:09

## 2025-09-03 RX ADMIN — FENTANYL CITRATE 100 MCG: 50 INJECTION, SOLUTION INTRAMUSCULAR; INTRAVENOUS at 11:09

## 2025-09-03 RX ADMIN — GLYCOPYRROLATE 0.8 MG: 0.2 INJECTION, SOLUTION INTRAMUSCULAR; INTRAVENOUS at 12:09

## 2025-09-03 RX ADMIN — HYDROMORPHONE HYDROCHLORIDE 0.5 MG: 2 INJECTION, SOLUTION INTRAMUSCULAR; INTRAVENOUS; SUBCUTANEOUS at 11:09

## (undated) DEVICE — KIT ENDO COMPLIANCE

## (undated) DEVICE — TUBING HYDRA IRR ERBE MEDIVATR

## (undated) DEVICE — BLOCK BITE ADULT 60FR

## (undated) DEVICE — UNDERPAD DELUXE FLUFF 30X30IN

## (undated) DEVICE — SOL IRRI STRL WATER 1000ML

## (undated) DEVICE — CANNULA SSOFT C02 MALE 14FT

## (undated) DEVICE — KIT COLLECTION MANIFOLD V2

## (undated) DEVICE — ELECTRODE MEDI-TRACE 855 FOAM

## (undated) DEVICE — LINER SUCTION CANNISTER REGUGA

## (undated) DEVICE — SPONGE DRY VIA GREEN